# Patient Record
Sex: FEMALE | Race: OTHER | HISPANIC OR LATINO
[De-identification: names, ages, dates, MRNs, and addresses within clinical notes are randomized per-mention and may not be internally consistent; named-entity substitution may affect disease eponyms.]

---

## 2017-05-02 ENCOUNTER — TRANSCRIPTION ENCOUNTER (OUTPATIENT)
Age: 57
End: 2017-05-02

## 2017-05-02 ENCOUNTER — INPATIENT (INPATIENT)
Facility: HOSPITAL | Age: 57
LOS: 1 days | Discharge: HOME | DRG: 149 | End: 2017-05-04
Attending: FAMILY MEDICINE | Admitting: FAMILY MEDICINE
Payer: MEDICAID

## 2017-05-02 VITALS
WEIGHT: 139.99 LBS | TEMPERATURE: 98 F | OXYGEN SATURATION: 99 % | RESPIRATION RATE: 16 BRPM | HEART RATE: 79 BPM | HEIGHT: 58 IN | DIASTOLIC BLOOD PRESSURE: 95 MMHG | SYSTOLIC BLOOD PRESSURE: 147 MMHG

## 2017-05-02 DIAGNOSIS — R42 DIZZINESS AND GIDDINESS: ICD-10-CM

## 2017-05-02 DIAGNOSIS — I63.9 CEREBRAL INFARCTION, UNSPECIFIED: ICD-10-CM

## 2017-05-02 DIAGNOSIS — E78.5 HYPERLIPIDEMIA, UNSPECIFIED: ICD-10-CM

## 2017-05-02 DIAGNOSIS — E03.9 HYPOTHYROIDISM, UNSPECIFIED: ICD-10-CM

## 2017-05-02 DIAGNOSIS — Z29.9 ENCOUNTER FOR PROPHYLACTIC MEASURES, UNSPECIFIED: ICD-10-CM

## 2017-05-02 DIAGNOSIS — K21.9 GASTRO-ESOPHAGEAL REFLUX DISEASE WITHOUT ESOPHAGITIS: ICD-10-CM

## 2017-05-02 DIAGNOSIS — R11.2 NAUSEA WITH VOMITING, UNSPECIFIED: ICD-10-CM

## 2017-05-02 DIAGNOSIS — R51 HEADACHE: ICD-10-CM

## 2017-05-02 DIAGNOSIS — Z92.3 PERSONAL HISTORY OF IRRADIATION: Chronic | ICD-10-CM

## 2017-05-02 LAB
ALBUMIN SERPL ELPH-MCNC: 4.6 G/DL — SIGNIFICANT CHANGE UP (ref 3.3–5.2)
ALP SERPL-CCNC: 105 U/L — SIGNIFICANT CHANGE UP (ref 40–120)
ALT FLD-CCNC: 16 U/L — SIGNIFICANT CHANGE UP
ANION GAP SERPL CALC-SCNC: 13 MMOL/L — SIGNIFICANT CHANGE UP (ref 5–17)
APTT BLD: 34.4 SEC — SIGNIFICANT CHANGE UP (ref 27.5–37.4)
AST SERPL-CCNC: 24 U/L — SIGNIFICANT CHANGE UP
BASOPHILS # BLD AUTO: 0 K/UL — SIGNIFICANT CHANGE UP (ref 0–0.2)
BASOPHILS NFR BLD AUTO: 0.4 % — SIGNIFICANT CHANGE UP (ref 0–2)
BILIRUB SERPL-MCNC: 0.3 MG/DL — LOW (ref 0.4–2)
BUN SERPL-MCNC: 11 MG/DL — SIGNIFICANT CHANGE UP (ref 8–20)
CALCIUM SERPL-MCNC: 9.1 MG/DL — SIGNIFICANT CHANGE UP (ref 8.6–10.2)
CHLORIDE SERPL-SCNC: 102 MMOL/L — SIGNIFICANT CHANGE UP (ref 98–107)
CO2 SERPL-SCNC: 26 MMOL/L — SIGNIFICANT CHANGE UP (ref 22–29)
CREAT SERPL-MCNC: 0.47 MG/DL — LOW (ref 0.5–1.3)
EOSINOPHIL # BLD AUTO: 0 K/UL — SIGNIFICANT CHANGE UP (ref 0–0.5)
EOSINOPHIL NFR BLD AUTO: 0.3 % — SIGNIFICANT CHANGE UP (ref 0–6)
GLUCOSE SERPL-MCNC: 113 MG/DL — SIGNIFICANT CHANGE UP (ref 70–115)
HCT VFR BLD CALC: 45.4 % — SIGNIFICANT CHANGE UP (ref 37–47)
HGB BLD-MCNC: 14.9 G/DL — SIGNIFICANT CHANGE UP (ref 12–16)
INR BLD: 0.94 RATIO — SIGNIFICANT CHANGE UP (ref 0.88–1.16)
LIDOCAIN IGE QN: 24 U/L — SIGNIFICANT CHANGE UP (ref 22–51)
LYMPHOCYTES # BLD AUTO: 1.1 K/UL — SIGNIFICANT CHANGE UP (ref 1–4.8)
LYMPHOCYTES # BLD AUTO: 14.2 % — LOW (ref 20–55)
MCHC RBC-ENTMCNC: 31.6 PG — HIGH (ref 27–31)
MCHC RBC-ENTMCNC: 32.8 G/DL — SIGNIFICANT CHANGE UP (ref 32–36)
MCV RBC AUTO: 96.4 FL — SIGNIFICANT CHANGE UP (ref 81–99)
MONOCYTES # BLD AUTO: 0.2 K/UL — SIGNIFICANT CHANGE UP (ref 0–0.8)
MONOCYTES NFR BLD AUTO: 3 % — SIGNIFICANT CHANGE UP (ref 3–10)
NEUTROPHILS # BLD AUTO: 6.2 K/UL — SIGNIFICANT CHANGE UP (ref 1.8–8)
NEUTROPHILS NFR BLD AUTO: 81.2 % — HIGH (ref 37–73)
PLATELET # BLD AUTO: 232 K/UL — SIGNIFICANT CHANGE UP (ref 150–400)
POTASSIUM SERPL-MCNC: 4.2 MMOL/L — SIGNIFICANT CHANGE UP (ref 3.5–5.3)
POTASSIUM SERPL-SCNC: 4.2 MMOL/L — SIGNIFICANT CHANGE UP (ref 3.5–5.3)
PROT SERPL-MCNC: 7.7 G/DL — SIGNIFICANT CHANGE UP (ref 6.6–8.7)
PROTHROM AB SERPL-ACNC: 10.3 SEC — SIGNIFICANT CHANGE UP (ref 9.8–12.7)
RBC # BLD: 4.71 M/UL — SIGNIFICANT CHANGE UP (ref 4.4–5.2)
RBC # FLD: 12.2 % — SIGNIFICANT CHANGE UP (ref 11–15.6)
SODIUM SERPL-SCNC: 141 MMOL/L — SIGNIFICANT CHANGE UP (ref 135–145)
T3 SERPL-MCNC: 135 NG/DL — SIGNIFICANT CHANGE UP (ref 80–200)
T4 AB SER-ACNC: 8.6 UG/DL — SIGNIFICANT CHANGE UP (ref 4.5–12)
T4/T3 UPTAKE INDEX SERPL: 0.94 INDEX — SIGNIFICANT CHANGE UP (ref 0.8–1.3)
TROPONIN T SERPL-MCNC: <0.01 NG/ML — SIGNIFICANT CHANGE UP (ref 0–0.06)
TSH SERPL-MCNC: 0.17 UIU/ML — LOW (ref 0.27–4.2)
WBC # BLD: 7.6 K/UL — SIGNIFICANT CHANGE UP (ref 4.8–10.8)
WBC # FLD AUTO: 7.6 K/UL — SIGNIFICANT CHANGE UP (ref 4.8–10.8)

## 2017-05-02 PROCEDURE — 70553 MRI BRAIN STEM W/O & W/DYE: CPT | Mod: 26

## 2017-05-02 PROCEDURE — 70450 CT HEAD/BRAIN W/O DYE: CPT | Mod: 26

## 2017-05-02 PROCEDURE — 72125 CT NECK SPINE W/O DYE: CPT | Mod: 26

## 2017-05-02 PROCEDURE — 93306 TTE W/DOPPLER COMPLETE: CPT | Mod: 26

## 2017-05-02 PROCEDURE — 99285 EMERGENCY DEPT VISIT HI MDM: CPT | Mod: 25

## 2017-05-02 PROCEDURE — 93010 ELECTROCARDIOGRAM REPORT: CPT

## 2017-05-02 PROCEDURE — 62270 DX LMBR SPI PNXR: CPT

## 2017-05-02 PROCEDURE — 70549 MR ANGIOGRAPH NECK W/O&W/DYE: CPT | Mod: 26

## 2017-05-02 PROCEDURE — 70545 MR ANGIOGRAPHY HEAD W/DYE: CPT | Mod: 26,59

## 2017-05-02 RX ORDER — SODIUM CHLORIDE 9 MG/ML
1000 INJECTION INTRAMUSCULAR; INTRAVENOUS; SUBCUTANEOUS ONCE
Qty: 0 | Refills: 0 | Status: COMPLETED | OUTPATIENT
Start: 2017-05-02 | End: 2017-05-02

## 2017-05-02 RX ORDER — METOCLOPRAMIDE HCL 10 MG
10 TABLET ORAL ONCE
Qty: 0 | Refills: 0 | Status: COMPLETED | OUTPATIENT
Start: 2017-05-02 | End: 2017-05-02

## 2017-05-02 RX ORDER — LEVOTHYROXINE SODIUM 125 MCG
75 TABLET ORAL DAILY
Qty: 0 | Refills: 0 | Status: DISCONTINUED | OUTPATIENT
Start: 2017-05-02 | End: 2017-05-04

## 2017-05-02 RX ORDER — PANTOPRAZOLE SODIUM 20 MG/1
40 TABLET, DELAYED RELEASE ORAL DAILY
Qty: 0 | Refills: 0 | Status: DISCONTINUED | OUTPATIENT
Start: 2017-05-02 | End: 2017-05-04

## 2017-05-02 RX ORDER — ONDANSETRON 8 MG/1
4 TABLET, FILM COATED ORAL EVERY 6 HOURS
Qty: 0 | Refills: 0 | Status: DISCONTINUED | OUTPATIENT
Start: 2017-05-02 | End: 2017-05-02

## 2017-05-02 RX ORDER — ACETAMINOPHEN 500 MG
650 TABLET ORAL EVERY 6 HOURS
Qty: 0 | Refills: 0 | Status: DISCONTINUED | OUTPATIENT
Start: 2017-05-02 | End: 2017-05-04

## 2017-05-02 RX ORDER — ENOXAPARIN SODIUM 100 MG/ML
40 INJECTION SUBCUTANEOUS DAILY
Qty: 0 | Refills: 0 | Status: DISCONTINUED | OUTPATIENT
Start: 2017-05-02 | End: 2017-05-02

## 2017-05-02 RX ORDER — ATORVASTATIN CALCIUM 80 MG/1
40 TABLET, FILM COATED ORAL AT BEDTIME
Qty: 0 | Refills: 0 | Status: DISCONTINUED | OUTPATIENT
Start: 2017-05-02 | End: 2017-05-04

## 2017-05-02 RX ORDER — ONDANSETRON 8 MG/1
4 TABLET, FILM COATED ORAL ONCE
Qty: 0 | Refills: 0 | Status: COMPLETED | OUTPATIENT
Start: 2017-05-02 | End: 2017-05-02

## 2017-05-02 RX ORDER — MECLIZINE HCL 12.5 MG
25 TABLET ORAL EVERY 6 HOURS
Qty: 0 | Refills: 0 | Status: DISCONTINUED | OUTPATIENT
Start: 2017-05-02 | End: 2017-05-02

## 2017-05-02 RX ORDER — MECLIZINE HCL 12.5 MG
25 TABLET ORAL ONCE
Qty: 0 | Refills: 0 | Status: COMPLETED | OUTPATIENT
Start: 2017-05-02 | End: 2017-05-02

## 2017-05-02 RX ORDER — ONDANSETRON 8 MG/1
4 TABLET, FILM COATED ORAL EVERY 6 HOURS
Qty: 0 | Refills: 0 | Status: DISCONTINUED | OUTPATIENT
Start: 2017-05-02 | End: 2017-05-04

## 2017-05-02 RX ORDER — SODIUM CHLORIDE 9 MG/ML
1000 INJECTION INTRAMUSCULAR; INTRAVENOUS; SUBCUTANEOUS
Qty: 0 | Refills: 0 | Status: DISCONTINUED | OUTPATIENT
Start: 2017-05-02 | End: 2017-05-04

## 2017-05-02 RX ADMIN — Medication 10 MILLIGRAM(S): at 17:59

## 2017-05-02 RX ADMIN — ONDANSETRON 4 MILLIGRAM(S): 8 TABLET, FILM COATED ORAL at 17:59

## 2017-05-02 RX ADMIN — SODIUM CHLORIDE 2000 MILLILITER(S): 9 INJECTION INTRAMUSCULAR; INTRAVENOUS; SUBCUTANEOUS at 17:59

## 2017-05-02 RX ADMIN — Medication 25 MILLIGRAM(S): at 15:04

## 2017-05-02 NOTE — ED PROVIDER NOTE - OBJECTIVE STATEMENT
56 year old female with a PMH of levothyroxine and hyperlipidemia presents today for chief complaint of left chest pain , dizziness, and vomiting since 6 am this morning. Patient describes the chest pain as 8/10 left chest pain without radiation. Patient has also vomited green vomitus 10 times. Patient denies any cardiac disease and abdominal pain. Patient denies having any similar symptoms in the past. Denies headache, dizziness.

## 2017-05-02 NOTE — ED PROVIDER NOTE - PROGRESS NOTE DETAILS
patient feeling better but still symptomatic with HA and neck pain worse with changes in position but also has at rest will admit for additional supportive care/ mri vasc anomaly/ dissection

## 2017-05-02 NOTE — H&P ADULT - NSHPPHYSICALEXAM_GEN_ALL_CORE
General: mild distress due to nausea, lying in bed, well developed, well groomed   HEENT: EOMI/CUATE, throat no exudates, no erythema, tympanic membrane on the left with normal light reflex, tympanic membrane on the right with absent light reflex, erythema of the external acustic meatus, however no bulging of membrane, no air fluid levels .  No nystagmus. No hearing loss.   Neck: No JVD, supple, normal thyroid gland, carotid bruit on the right side   Cardiac: s1/s2/rrr/no s4 or s3, no murmurs  Pulmonary:   clear to auscultation bilaterally, no rales, no ronchi  Abdominal: soft, no guarding or rebound, bowel sounds positive  Extremities: no calf tednerness, no edema, no cyanosis   Neurological: alert and oriented x 3, cranial nerves 2-12 intact, Romberg (-), normal gait, no disdaochokinesis, finger to nose is normal.  MMSE = 28/30  NIHS score=0.  Maxwell-Hallpike manuveur negative. General: mild distress due to nausea, lying in bed, well developed, well groomed   HEENT: EOMI/CUATE, throat no exudates, no erythema, tympanic membrane on the left with normal light reflex, tympanic membrane on the right with absent light reflex, erythema of the external acustic meatus, however no bulging of membrane, no air fluid levels .  No nystagmus. No hearing loss. Brudiznski and Kerning is negative.   Neck: No JVD, supple, normal thyroid gland, carotid bruit on the right side   Cardiac: s1/s2/rrr/no s4 or s3, no murmurs  Pulmonary:   clear to auscultation bilaterally, no rales, no ronchi  Abdominal: soft, no guarding or rebound, bowel sounds positive  Extremities: no calf tednerness, no edema, no cyanosis   Neurological: alert and oriented x 3, cranial nerves 2-12 intact, Romberg (-), normal gait, no disdaochokinesis, finger to nose is normal.  MMSE = 28/30  NIHS score=0.  Valentina-Hallpike manuveur negative. General: mild distress due to nausea, lying in bed, well developed, well groomed   HEENT: EOMI/CUATE, throat no exudates, no erythema, tympanic membrane on the left with normal light reflex, tympanic membrane on the right with absent light reflex, erythema of the external acustic meatus, however no bulging of membrane, no air fluid levels .  No nystagmus. No hearing loss. Brudiznski and Kerning is negative.   Neck: No JVD, supple, normal thyroid gland, carotid bruit on the right side   Cardiac: s1/s2/rrr/no s4 or s3, no murmurs  Pulmonary:   clear to auscultation bilaterally, no rales, no ronchi  Abdominal: soft, no guarding or rebound, bowel sounds positive  Extremities: no calf tednerness, no edema, no cyanosis   Neurological: alert and oriented x 3, cranial nerves 2-12 intact, Romberg (-), normal gait, no disdaochokinesis, finger to nose is normal.  MMSE = 28/30  NIHS score=0. General: mild distress due to nausea, lying in bed, well developed, well groomed   HEENT: EOMI/CUATE, throat no exudates, no erythema, tympanic membrane on the left with normal light reflex, tympanic membrane on the right with absent light reflex, erythema of the external acustic meatus, however no bulging of membrane, no air fluid levels .  No nystagmus. No hearing loss. Brudiznski and Kerning is negative.   Neck: No JVD, supple, normal thyroid gland, carotid bruit on the right side   Cardiac: s1/s2/rrr/no s4 or s3, no murmurs  Pulmonary:   clear to auscultation bilaterally, no rales, no ronchi  Abdominal: soft, no guarding or rebound, bowel sounds positive  Extremities: no calf tednerness, no edema, no cyanosis   Neurological: alert and oriented x 3, cranial nerves 2-12 intact, Romberg (-),  patient ambulates with difficulty swaying from side to side, no disdaochokinesis, finger to nose is normal.  MMSE = 28/30  NIHS score=0.

## 2017-05-02 NOTE — H&P ADULT - PROBLEM SELECTOR PLAN 2
- continue atorvastatin  - lipid panel - cause of vertigo is unclear   - meclizine 25 mg q 6 hours   - Romberg is negative,  neurological exam is normal, will do Valentina-Hallpike manuveur once patient's neck ache has resolved  - CT of head, MRI/MRA of head, carotid ultrasound, TTE - cause of vertigo is unclear   - meclizine 25 mg q 6 hours PRN  - Romberg is negative,  neurological exam is normal, will do Valentina-Hallpike manuveur once patient's neck ache has resolved  - CT of head, MRI/MRA of head, carotid ultrasound, TTE - cause of vertigo is unclear   - meclizine 25 mg q 6 hours PRN  - Romberg is negative,  neurological exam is normal, no nystagmus, will do [place]-Hallpike manuveur once patient's neck ache has resolved  - CT of head, MRI/MRA of head, carotid ultrasound, TTE

## 2017-05-02 NOTE — ED ADULT TRIAGE NOTE - CHIEF COMPLAINT QUOTE
pt presents to ED with dizziness and n/v upon waking up this morning. pt has hx of vertigo. denies chest pain. breathing si even and unlabored.

## 2017-05-02 NOTE — ED PROVIDER NOTE - MEDICAL DECISION MAKING DETAILS
56 year female with chest pain, dizziness, and vomiting. No ST elevations or depressions. Troponin negative. Symptoms likely secondary to vertigo. Meclizine 25 mg and 1 bolus of NS given. Patient reports improvement.

## 2017-05-02 NOTE — ED STATDOCS - DETAILS:
ISean, performed the initial face to face focussed bedside interview with this patient regarding history of present illness as well as a targetted review of systems and an independent physical examination. This brief note is representative of this interaction during which we determined the patient should be transferred to the main ED for further evaluation and treatment and a complete note to be completed by the primary provider.

## 2017-05-02 NOTE — H&P ADULT - PROBLEM SELECTOR PLAN 5
- TSH is low, however t4 and t3 are normal   - continue with levothyroxine - tylenol PRN  - if pain is not controlled percocet - continue atorvastatin  - lipid panel

## 2017-05-02 NOTE — ED STATDOCS - PROGRESS NOTE DETAILS
: Bruna. 57 y/o F pt w/ PMHx enlarged thyroid presents to the ED c/o dizziness, headache, and vomiting onset today morning at 06:00. Pt states that she woke up today w/ her sx. Pt also notes neck pain. Pt had similar sx years ago and was dx w/ vertigo. Pt's dizziness is aggravated w/ movement, and is relieved w/ rest. Pt's dizziness lasts for 30 seconds and resolves after vomiting. Pt denies difficulty ambulating, SOB, chest pain, hearing changes, fever, or any other complaints. Pt is allergic to Amoxicillin. Pt w/ horizontal beating nystagmus which was not present initially but was present after I got pt dizzy. No meningismus or photophobia. Brief episodic intermittent provoked vertigo, may be BPPV, but onset w/ headache and neck pain suggest other possible etiologies. Focused evaluation, orders entered, placed in main for further evaluation by another provider.

## 2017-05-02 NOTE — H&P ADULT - NSHPREVIEWOFSYSTEMS_GEN_ALL_CORE
patient denies diarrhea, denies diarrhea, denies abdominal pain  complains of some sensation of acidity in her mouth but that only started after the patient started vomiting  denies fever, chills, denies nasal discharge, cough, chest pain or shortness of breath   rest of review of systems as above

## 2017-05-02 NOTE — H&P ADULT - PROBLEM SELECTOR PLAN 1
- rule out CVA  - admit to 5 tower step down under Dr. Orellana  - ambulate with assistance  - diet dash/tlc  - CBC/CMP to be repeated in the AM  - TTE/carotid ultrasound, CT of the head has already been done  - MRI/MRA of the head   - - rule out CVA  - admit to 5 tower step down under Dr. Orellana  - ambulate with assistance  - diet dash/tlc  - CBC/CMP to be repeated in the AM, Mg, Phosphorus, hemoglobin a1c, PT/INR/lipid panel in AM, TSH  - TTE/carotid ultrasound, CT of the head has already been done  - MRI/MRA of the head   - aspirin 325 mg, atorvastin 40 mg   = NIHSS score, fall precautions  - neurology consult with Dr. Darnell - patient has sudden onset dizziness, nausea and vomiting could be due to CVA  - rule out CVA  - admit to 5 tower step down under Dr. Orellana  - ambulate with assistance  - diet dash/tlc  - CBC/CMP to be repeated in the AM, Mg, Phosphorus, hemoglobin a1c, PT/INR/lipid panel in AM, TSH  - TTE/carotid ultrasound, CT of the head has already been done  - MRI/MRA of the head   - aspirin 325 mg, atorvastin 40 mg   = NIHSS score, fall precautions  - neurology consult with Dr. Darnell - patient has sudden onset dizziness, nausea and vomiting could be due to CVA  - rule out CVA  - admit to 5 tower step down under Dr. Orellana  - ambulate with assistance  - diet dash/tlc  - CBC/CMP to be repeated in the AM, Mg, Phosphorus, hemoglobin a1c, PT/INR/lipid panel in AM, TSH  - TTE/carotid ultrasound, CT of the head has already been done  - MRI/MRA of the head   - atorvastin 40 mg   - NIHSS score, fall precautions  - neurology consult with Dr. Darnell - patient has sudden onset dizziness, nausea and vomiting could be due to CVA  - rule out CVA  - admit to 5 tower step down under Dr. Orellana  - ambulate with assistance  - diet dash/tlc  - CBC/CMP to be repeated in the AM, Mg, Phosphorus, hemoglobin a1c, PT/INR/lipid panel in AM, TSH  - TTE/carotid ultrasound, CT of the head has already been done  - MRI/MRA of the head   - atorvastin 40 mg, aspirin is held until SAH is ruled out  - NIHSS score, fall precautions  - neurology consult with Dr. Darnell - patient has sudden onset dizziness, nausea and vomiting could be due to CVA (cerebellar)  - rule out CVA  - admit to 5 tower step down under Dr. Orellana  - ambulate with assistance  - diet dash/tlc  - CBC/CMP to be repeated in the AM, Mg, Phosphorus, hemoglobin a1c, PT/INR/lipid panel in AM, TSH  - TTE/carotid ultrasound, CT of the head has already been done  - MRI/MRA of the head   - atorvastin 40 mg, aspirin is held until SAH is ruled out  - NIHSS score, fall precautions  - neurology consult with Dr. Darnell

## 2017-05-02 NOTE — H&P ADULT - NSHPLABSRESULTS_GEN_ALL_CORE
CBC:                    14.9   7.6   )-----------( 232      ( 02 May 2017 15:26 )             45.4      CMP:   05-02    141  |  102  |  11.0  ----------------------------<  113  4.2   |  26.0  |  0.47<L>    Ca    9.1      02 May 2017 15:26    TPro  7.7  /  Alb  4.6  /  TBili  0.3<L>  /  DBili  x   /  AST  24  /  ALT  16  /  AlkPhos  105  05-02    CT scan:   There is no compelling evidence for an acute infarction. There is no   evidence of mass, mass effect, midline shift or extra-axial fluid   collection. The lateral ventricles and cortical sulci are normal in size   and configuration. Mild to moderate inflammatory mucosal changes are seen   throughout the various portions of the paranasal sinuses. Old left orbit   medial wall fracture deformity. The orbits and mastoid air cells are   otherwise unremarkable. The calvarium is intact. Developmental nonfused   posterior C1 arch is noted.        Thyroid function studies:     TSH: 0.17  T4= 8.6   T3= 135     ECG: regular rate, regular rhythm, normal axis, no st elevtion, no st depression, no q waves, biphasic t waves in v2/v3

## 2017-05-02 NOTE — H&P ADULT - NSHPSOCIALHISTORY_GEN_ALL_CORE
doesn't smoke, doesn't drink, doesn't use any kind of illegal drugs  patient lives with son    - doesn't work, previously worked in factory

## 2017-05-02 NOTE — H&P ADULT - PROBLEM SELECTOR PLAN 3
- cause of vertigo is unclear   - meclizine 25 mg q 6 hours   - Romberg is negative,  neurological exam is normal  - CT of head, MRI/MRA of head, carotid ultrasound, TTE - cause of vertigo is unclear   - meclizine 25 mg q 6 hours   - Romberg is negative,  neurological exam is normal, will do Valentina-Hallpike manuveur once patient's neck ache has resolved  - CT of head, MRI/MRA of head, carotid ultrasound, TTE - ondansentron 4 mg q 6 hours   - meclizine 25 mg q 6 hours in case of vertigo

## 2017-05-02 NOTE — H&P ADULT - PROBLEM SELECTOR PLAN 6
- secondary to vomiting  - pantoprazole  40 mg IV - TSH is low, however t4 and t3 are normal   - continue with levothyroxine

## 2017-05-02 NOTE — ED ADULT NURSE NOTE - OBJECTIVE STATEMENT
pt c/o dizziness and headache today pt also states that she is very nauseous and has been vomiting today

## 2017-05-02 NOTE — H&P ADULT - HISTORY OF PRESENT ILLNESS
Patient is 56 year old female with previous medical history of hyperlipidemia, hyperthyroidism who came into the hospital complaining of vertigo which started this morning at 6 am as she got out of bed and walked to the bathroom. The patient states that the vertigo started as she was walking, and it was accompanied by several episodes of non-bloody non-billious vomiting. The patient states that today she had 10 episodes of vomiting.  The patient says the "vertigo" feels like the room is spinning around her and it is excacebrated by standing and walking around. She denies excacerbation upon lying down and moving her head around.   The patient had vertigo in the past and was previously at [facility] for vertigo 5 years ago and was discharged with diagnosis of benign vertigo. The vertigo is also accompanied by headache and neck ache. The patient never had similar headache before, it is 8/10, constant, pulsating,  occipital, radiating down to the neck and the patient states that she has some difficulty moving her neck. The patient has had some mild headache in the past, but nothing similar to the headache she is having now. Patient has no history of migraines and has no family history of migraines. Denies sonophobia, however complains of photofobia which also started this morning.  Denies unilateral nasal discharge, denies eye tearing.    Denies history of recent viral infections in the past month, denies tinnitus, denies hearing loss.   Denies fever, chills, cough,  recent travel, sick contacts, denies mosquito bites, denies tick bites.   Denies any neurological symptoms such as double vision, blurred vision, loss of motor strenght, loss of sensation. Patient is 56 year old female with previous medical history of hyperlipidemia, hyperthyroidism who came into the hospital complaining of vertigo which started this morning at 6 am as she got out of bed and walked to the bathroom. The patient states that the vertigo started as she was walking, and it was accompanied by several episodes of non-bloody non-billious vomiting. The patient states that today she had 10 episodes of vomiting.  The patient says the "vertigo" feels like the room is spinning around her and it is excacebrated by standing and walking around. She denies excacerbation upon lying down and moving her head around.   The patient had vertigo in the past and was previously at [facility] for vertigo 5 years ago and was discharged with diagnosis of benign vertigo. The vertigo is also accompanied by headache and neck ache. The patient never had similar headache before, it is 8/10, constant, pulsating,  occipital, radiating down to the neck and the patient states that she has some difficulty moving her neck. The patient has had some mild headache in the past, but nothing similar to the headache she is having now. Patient has no history of migraines and has no family history of migraines. Denies seeing lights before headache, denies seeing wavy lines, denies sonophobia, however complains of photofobia which also started this morning.  Denies unilateral nasal discharge, denies eye tearing.    Denies history of recent viral infections in the past month, denies tinnitus, denies hearing loss.   Denies fever, chills, cough,  recent travel, sick contacts, denies mosquito bites, denies tick bites.   Denies any neurological symptoms such as double vision, blurred vision, loss of motor strength loss of sensation. Patient is 56 year old female with previous medical history of hyperlipidemia, hyperthyroidism who came into the hospital complaining of vertigo which started this morning at 6 am as she got out of bed and walked to the bathroom. The patient states that the vertigo started as she was walking, and it was accompanied by several episodes of non-bloody non-billious vomiting. The patient states that today she had 10 episodes of vomiting.  The patient says the "vertigo" feels like the room is spinning around her and it is excacebrated by standing and walking around. She denies excacerbation upon lying down and moving her head around.   The patient had vertigo in the past and was previously at [facility] for vertigo 5 years ago and was discharged with diagnosis of benign vertigo. The vertigo is also accompanied by headache and neck ache. The patient never had similar headache before, it is 8/10, constant, pulsating,  occipital, radiating down to the neck and the patient states that she has some difficulty moving her neck. The patient has had some mild headache in the past, but nothing similar to the headache she is having now. Patient has no history of migraines and has no family history of migraines. Denies seeing lights before headache, denies seeing wavy lines, denies sonophobia, however complains of photofobia which also started this morning.  Denies unilateral nasal discharge, denies eye tearing.    Denies history of recent viral infections in the past month, denies tinnitus, denies hearing loss.   Denies fever, chills, cough, rashes,  recent travel, sick contacts, denies mosquito bites, denies tick bites.   Denies any neurological symptoms such as double vision, blurred vision, loss of motor strength loss of sensation. Patient is 56 year old female with previous medical history of hyperlipidemia, hyperthyroidism who came into the hospital complaining of vertigo which started this morning at 6 am as she got out of bed and walked to the bathroom. The patient states that the vertigo started as she was walking, she started to have some epigastric burning pain which was accompanied by several episodes of non-bloody non-billious vomiting. The patient states that today she had 10 episodes of vomiting.  The patient says the "vertigo" feels like the room is spinning around her and it is exacerbated by standing and walking around. She denies exacerbation upon lying down and moving her head around.   The patient had vertigo in the past and was previously at [facility] for vertigo 5 years ago and was discharged with diagnosis of benign vertigo. The vertigo is also accompanied by headache and neck ache. The patient never had similar headache before, it is 8/10, constant, pulsating,  occipital, radiating down to the neck and the patient states that she has some difficulty moving her neck. The patient has had some mild headache in the past, but nothing similar to the headache she is having now. Patient has no history of migraines and has no family history of migraines. Denies seeing lights before headache, denies seeing wavy lines, denies sonophobia, however complains of photophobia which also started this morning.  Denies unilateral nasal discharge, denies eye tearing.    Denies history of recent viral infections in the past month, denies tinnitus, denies hearing loss.   Denies fever, chills, cough, rashes,  recent travel, sick contacts, denies mosquito bites, denies tick bites.   Denies any neurological symptoms such as double vision, blurred vision, loss of motor strength loss of sensation. Patient is 56 year old female with previous medical history of hyperlipidemia, hyperthyroidism who came into the hospital complaining of vertigo which started this morning at 6 am as she got out of bed and walked to the bathroom. The patient states that the vertigo started as she was walking, she started to have some left sided non-radting pressure like 7/10 chest pain for a couple of seconds, which was accompanied by 10 episodes of non-bloody nonbilious vomiting. The chest pain has not reoccurred since then and she has never had it before.   The patient says the "vertigo" feels like the room is spinning around her and it is exacerbated by standing and walking around. She denies exacerbation upon lying down and moving her head around.   The patient had vertigo in the past and was previously at [facility] for vertigo 5 years ago and was discharged with diagnosis of non-specific vertigo. The vertigo is also accompanied by headache and neck ache. The neck and headache started a minute after the patient had the vertigo. The patient never had similar headache before, it is 8/10, constant, pulsating,  occipital, radiating down to the neck and the patient states that she has some difficulty moving her neck. The patient has had some mild headache in the past, but nothing similar to the headache she is having now. Patient has no history of migraines and has no family history of migraines. Denies seeing lights before headache, denies seeing wavy lines, denies sonophobia, however complains of photophobia which also started this morning.  Denies unilateral nasal discharge, denies eye tearing.    Denies history of recent viral infections in the past month, denies tinnitus, denies hearing loss.   Denies fever, chills, cough, rashes,  recent travel, sick contacts, denies mosquito bites, denies tick bites.   Denies any neurological symptoms such as double vision, blurred vision, loss of motor strength loss of sensation. Patient is 56 year old female with previous medical history of hyperlipidemia, hyperthyroidism who came into the hospital complaining of vertigo which started this morning at 6 am as she got out of bed and walked to the bathroom. The patient states that the vertigo started as she was walking, she started to have some left sided non-radting pressure like 7/10 chest pain for a couple of seconds, which was accompanied by seveeral episodes of non-bloody nonbilious vomiting (she had up to 10 episodes of vomiting during the day). The chest pain has not reoccurred since then and she has never had it before.   The patient says the "vertigo" feels like the room is spinning around her and it is exacerbated by standing and walking around. She denies exacerbation upon lying down and moving her head around.   The patient had vertigo in the past and was previously at [facility] for vertigo 5 years ago and was discharged with diagnosis of non-specific vertigo. The vertigo is also accompanied by headache and neck ache. The neck and headache started a minute after the patient had the vertigo. The patient never had similar headache before, it is 8/10, constant, pulsating,  occipital, radiating down to the neck and the patient states that she has some difficulty moving her neck. The patient has had some mild headache in the past, but nothing similar to the headache she is having now. Patient has no history of migraines and has no family history of migraines. Denies seeing lights before headache, denies seeing wavy lines, denies sonophobia, however complains of photophobia which also started this morning.  Denies unilateral nasal discharge, denies eye tearing.    Denies history of recent viral infections in the past month, denies tinnitus, denies hearing loss.   Denies fever, chills, cough, rashes,  recent travel, sick contacts, denies mosquito bites, denies tick bites.   Denies any neurological symptoms such as double vision, blurred vision, loss of motor strength loss of sensation. Patient is 56 year old female with previous medical history of hyperlipidemia, hyperthyroidism who came into the hospital complaining of vertigo which started this morning at 6 am as she got out of bed and walked to the bathroom. The patient states that the vertigo started as she was walking, she started to have some left sided non-radting pressure like 7/10 chest pain for a couple of seconds, which was accompanied by several episodes of non-bloody nonbilious vomiting (she had up to 10 episodes of vomiting during the day). The chest pain has not reoccurred since then and she has never had it before.   The patient says the "vertigo" feels like the room is spinning around her and it is exacerbated by standing and walking around. She denies exacerbation upon lying down and moving her head around.   The patient had vertigo in the past and was previously at [facility] for vertigo 5 years ago and was discharged with diagnosis of non-specific vertigo and told to follow up with PCP. The vertigo is also accompanied by headache and neck ache. The neck and headache started a minute after the patient had the vertigo. The patient never had similar headache before, it is 8/10, constant, pulsating,  occipital, radiating down to the neck and the patient states that she has some difficulty moving her neck. The patient has had some mild headache in the past, but nothing similar to the headache she is having now. Patient has no history of migraines and has no family history of migraines. Denies seeing lights before headache, denies seeing wavy lines, denies sonophobia, however complains of photophobia which also started this morning.  Denies unilateral nasal discharge, denies eye tearing.    Denies history of recent viral infections in the past month, denies tinnitus, denies hearing loss.   Denies fever, chills, cough, rashes,  recent travel, sick contacts, denies mosquito bites, denies tick bites.   Denies any neurological symptoms such as double vision, blurred vision, loss of motor strength loss of sensation.

## 2017-05-02 NOTE — H&P ADULT - ASSESSMENT
Patient is 56 year old female with previous medical history of hyperlipidemia, hyperthyroidism who came into the hospital complaining of vertigo which started this morning at 6 am as she got out of bed and walked to the bathroom.  Patient says vertigo is excacerbated by walking to the bathroom. Patient has had similar episodes of vertigo before, however all workup was negative.

## 2017-05-02 NOTE — H&P ADULT - PROBLEM SELECTOR PLAN 4
- ondansentron 4 mg q 6 hours   - meclizine 25 mg q 6 hours - ondansentron 4 mg q 6 hours   - meclizine 25 mg q 6 hours in case of vertigo - tylenol PRN  - if pain is not controlled percocet - cause of headache is unclear at this point, CT of head negative for subarachnoid hemorrhage  - will try to obtain consent for spinal tap to rule out meningitis   - tylenol PRN  - if pain is not controlled: percocet - cause of headache is unclear at this point, CT of head negative for subarachnoid hemorrhage  - will try to obtain consent for spinal tap to rule out aseptic meningitis/SAH  - tylenol PRN  - if pain is not controlled: percocet

## 2017-05-03 LAB
ALBUMIN SERPL ELPH-MCNC: 3.7 G/DL — SIGNIFICANT CHANGE UP (ref 3.3–5.2)
ALP SERPL-CCNC: 81 U/L — SIGNIFICANT CHANGE UP (ref 40–120)
ALT FLD-CCNC: 13 U/L — SIGNIFICANT CHANGE UP
ANION GAP SERPL CALC-SCNC: 10 MMOL/L — SIGNIFICANT CHANGE UP (ref 5–17)
APPEARANCE CSF: CLEAR — SIGNIFICANT CHANGE UP
AST SERPL-CCNC: 18 U/L — SIGNIFICANT CHANGE UP
BASOPHILS # BLD AUTO: 0 K/UL — SIGNIFICANT CHANGE UP (ref 0–0.2)
BASOPHILS NFR BLD AUTO: 0.5 % — SIGNIFICANT CHANGE UP (ref 0–2)
BILIRUB SERPL-MCNC: 0.4 MG/DL — SIGNIFICANT CHANGE UP (ref 0.4–2)
BUN SERPL-MCNC: 10 MG/DL — SIGNIFICANT CHANGE UP (ref 8–20)
CALCIUM SERPL-MCNC: 8.5 MG/DL — LOW (ref 8.6–10.2)
CHLORIDE SERPL-SCNC: 110 MMOL/L — HIGH (ref 98–107)
CHOLEST SERPL-MCNC: 191 MG/DL — SIGNIFICANT CHANGE UP (ref 110–199)
CO2 SERPL-SCNC: 25 MMOL/L — SIGNIFICANT CHANGE UP (ref 22–29)
COLOR CSF: SIGNIFICANT CHANGE UP
CREAT SERPL-MCNC: 0.39 MG/DL — LOW (ref 0.5–1.3)
CSF PCR RESULT: SIGNIFICANT CHANGE UP
EOSINOPHIL # BLD AUTO: 0.2 K/UL — SIGNIFICANT CHANGE UP (ref 0–0.5)
EOSINOPHIL NFR BLD AUTO: 1.9 % — SIGNIFICANT CHANGE UP (ref 0–6)
GLUCOSE CSF-MCNC: 63 MG/DL — SIGNIFICANT CHANGE UP (ref 40–70)
GLUCOSE SERPL-MCNC: 98 MG/DL — SIGNIFICANT CHANGE UP (ref 70–115)
GRAM STN FLD: SIGNIFICANT CHANGE UP
HBA1C BLD-MCNC: 5.4 % — SIGNIFICANT CHANGE UP (ref 4–5.6)
HCT VFR BLD CALC: 40.4 % — SIGNIFICANT CHANGE UP (ref 37–47)
HDLC SERPL-MCNC: 63 MG/DL — LOW
HGB BLD-MCNC: 13.2 G/DL — SIGNIFICANT CHANGE UP (ref 12–16)
LIPID PNL WITH DIRECT LDL SERPL: 108 MG/DL — SIGNIFICANT CHANGE UP
LYMPHOCYTES # BLD AUTO: 2.5 K/UL — SIGNIFICANT CHANGE UP (ref 1–4.8)
LYMPHOCYTES # BLD AUTO: 29.4 % — SIGNIFICANT CHANGE UP (ref 20–55)
MAGNESIUM SERPL-MCNC: 2.3 MG/DL — SIGNIFICANT CHANGE UP (ref 1.8–2.5)
MCHC RBC-ENTMCNC: 31.7 PG — HIGH (ref 27–31)
MCHC RBC-ENTMCNC: 32.7 G/DL — SIGNIFICANT CHANGE UP (ref 32–36)
MCV RBC AUTO: 96.9 FL — SIGNIFICANT CHANGE UP (ref 81–99)
MONOCYTES # BLD AUTO: 0.6 K/UL — SIGNIFICANT CHANGE UP (ref 0–0.8)
MONOCYTES NFR BLD AUTO: 7.4 % — SIGNIFICANT CHANGE UP (ref 3–10)
NEUTROPHILS # BLD AUTO: 5.2 K/UL — SIGNIFICANT CHANGE UP (ref 1.8–8)
NEUTROPHILS # CSF: SIGNIFICANT CHANGE UP %
NEUTROPHILS NFR BLD AUTO: 60.4 % — SIGNIFICANT CHANGE UP (ref 37–73)
NRBC NFR CSF: 1 /UL — SIGNIFICANT CHANGE UP (ref 0–5)
PHOSPHATE SERPL-MCNC: 4 MG/DL — SIGNIFICANT CHANGE UP (ref 2.4–4.7)
PLATELET # BLD AUTO: 197 K/UL — SIGNIFICANT CHANGE UP (ref 150–400)
POTASSIUM SERPL-MCNC: 3.8 MMOL/L — SIGNIFICANT CHANGE UP (ref 3.5–5.3)
POTASSIUM SERPL-SCNC: 3.8 MMOL/L — SIGNIFICANT CHANGE UP (ref 3.5–5.3)
PROT CSF-MCNC: 24 MG/DL — SIGNIFICANT CHANGE UP (ref 15–45)
PROT SERPL-MCNC: 6.5 G/DL — LOW (ref 6.6–8.7)
RBC # BLD: 4.17 M/UL — LOW (ref 4.4–5.2)
RBC # CSF: 4 /CMM — HIGH (ref 0–1)
RBC # FLD: 12.4 % — SIGNIFICANT CHANGE UP (ref 11–15.6)
SODIUM SERPL-SCNC: 145 MMOL/L — SIGNIFICANT CHANGE UP (ref 135–145)
TOTAL CHOLESTEROL/HDL RATIO MEASUREMENT: 3 RATIO — LOW (ref 3.3–7.1)
TRIGL SERPL-MCNC: 102 MG/DL — SIGNIFICANT CHANGE UP (ref 10–200)
TROPONIN T SERPL-MCNC: <0.01 NG/ML — SIGNIFICANT CHANGE UP (ref 0–0.06)
TUBE TYPE: SIGNIFICANT CHANGE UP
WBC # BLD: 8.5 K/UL — SIGNIFICANT CHANGE UP (ref 4.8–10.8)
WBC # FLD AUTO: 8.5 K/UL — SIGNIFICANT CHANGE UP (ref 4.8–10.8)

## 2017-05-03 PROCEDURE — 93010 ELECTROCARDIOGRAM REPORT: CPT

## 2017-05-03 PROCEDURE — 99233 SBSQ HOSP IP/OBS HIGH 50: CPT | Mod: GC

## 2017-05-03 PROCEDURE — 93880 EXTRACRANIAL BILAT STUDY: CPT | Mod: 26

## 2017-05-03 RX ORDER — ASPIRIN/CALCIUM CARB/MAGNESIUM 324 MG
325 TABLET ORAL DAILY
Qty: 0 | Refills: 0 | Status: DISCONTINUED | OUTPATIENT
Start: 2017-05-03 | End: 2017-05-03

## 2017-05-03 RX ORDER — ATORVASTATIN CALCIUM 80 MG/1
1 TABLET, FILM COATED ORAL
Qty: 0 | Refills: 0 | COMMUNITY
Start: 2017-05-03

## 2017-05-03 RX ORDER — ASPIRIN/CALCIUM CARB/MAGNESIUM 324 MG
325 TABLET ORAL DAILY
Qty: 0 | Refills: 0 | Status: DISCONTINUED | OUTPATIENT
Start: 2017-05-03 | End: 2017-05-04

## 2017-05-03 RX ORDER — ENOXAPARIN SODIUM 100 MG/ML
40 INJECTION SUBCUTANEOUS DAILY
Qty: 0 | Refills: 0 | Status: DISCONTINUED | OUTPATIENT
Start: 2017-05-03 | End: 2017-05-04

## 2017-05-03 RX ADMIN — PANTOPRAZOLE SODIUM 40 MILLIGRAM(S): 20 TABLET, DELAYED RELEASE ORAL at 12:33

## 2017-05-03 RX ADMIN — Medication 75 MICROGRAM(S): at 06:28

## 2017-05-03 RX ADMIN — ATORVASTATIN CALCIUM 40 MILLIGRAM(S): 80 TABLET, FILM COATED ORAL at 01:35

## 2017-05-03 RX ADMIN — SODIUM CHLORIDE 100 MILLILITER(S): 9 INJECTION INTRAMUSCULAR; INTRAVENOUS; SUBCUTANEOUS at 21:14

## 2017-05-03 RX ADMIN — Medication 325 MILLIGRAM(S): at 06:27

## 2017-05-03 RX ADMIN — ENOXAPARIN SODIUM 40 MILLIGRAM(S): 100 INJECTION SUBCUTANEOUS at 21:13

## 2017-05-03 RX ADMIN — ATORVASTATIN CALCIUM 40 MILLIGRAM(S): 80 TABLET, FILM COATED ORAL at 21:13

## 2017-05-03 RX ADMIN — SODIUM CHLORIDE 100 MILLILITER(S): 9 INJECTION INTRAMUSCULAR; INTRAVENOUS; SUBCUTANEOUS at 06:33

## 2017-05-03 RX ADMIN — Medication 325 MILLIGRAM(S): at 12:33

## 2017-05-03 RX ADMIN — SODIUM CHLORIDE 100 MILLILITER(S): 9 INJECTION INTRAMUSCULAR; INTRAVENOUS; SUBCUTANEOUS at 01:35

## 2017-05-03 NOTE — ED PROCEDURE NOTE - CPROC ED POST PROC CARE GUIDE1
Pt admitted for monitoring/Verbal/written post procedure instructions were given to patient/caregiver.

## 2017-05-03 NOTE — CONSULT NOTE ADULT - ASSESSMENT
The patient is a 56y Female with resolved vertigo of likely peripheral cause.  MRI head did not ashow cerebellar pathology and MRA did not reveal VBI.  Small cavernous ICA aneurysm, can follow up with neurosurgeon as outpatient- likely will need to be monitored with serial MRAs.    There is no further neurologic workup suggested at this time.  We will be available for reconsultation as needed.    Thank you.   Han Boyd MD, PhD  [phone]

## 2017-05-03 NOTE — DISCHARGE NOTE ADULT - PLAN OF CARE
NO evidence of CVA. Patient should follow up with neurology and PMD Continue aspirin and statin as prescribed.  Follow up at [facility] May 11th at 2:30pm Improved. Asymptomatic. Encourage adequate fluid hydration. Follow up with PMD Currently without symptoms. Follow up with PMD Follow up with PMD. Over the counter Tylenol if needed for headache Continue statin medication as prescribed. 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 Patient should follow up with Neurosurgery for repeat MRI and further recommendations.

## 2017-05-03 NOTE — ED ADULT NURSE REASSESSMENT NOTE - GENERAL PATIENT STATE
comfortable appearance
comfortable appearance/family/SO at bedside

## 2017-05-03 NOTE — ED ADULT NURSE REASSESSMENT NOTE - NS ED NURSE REASSESS COMMENT FT1
received patient alert skin warm and dry color good iv infusing well on monitor and  spinal tap done will continue to monitor patient
Pt ambulatory to the bathroom, tolerated well.
Assumed pt care at this time. Pt A & OX3, respirations even & unlabored. Pt ambulated to the bathroom with assistance, tolerated well, Pt offers no complaints, states she "feels a little better". Pt waiting for bed assignment, will continue to monitor.
Pt medicated with 12PM meds, tolerated well. Pt remains A & Ox3. c/o slight dizziness with movement but states headache improved.

## 2017-05-03 NOTE — DISCHARGE NOTE ADULT - MEDICATION SUMMARY - MEDICATIONS TO STOP TAKING
I will STOP taking the medications listed below when I get home from the hospital:    pravastatin 20 mg oral tablet  -- 1 tab(s) by mouth once a day (at bedtime)

## 2017-05-03 NOTE — DISCHARGE NOTE ADULT - CARE PROVIDERS DIRECT ADDRESSES
,DirectAddress_Unknown,DirectAddress_Unknown,[email or web address]

## 2017-05-03 NOTE — ED PROCEDURE NOTE - DETAILS:
ATTENDING MD:  I, Sean Eagle, was available in the Emergency Department throughout the entire procedure and I was present during the key portions. I agree with the procedure as documented.

## 2017-05-03 NOTE — DISCHARGE NOTE ADULT - HOSPITAL COURSE
Patient is 56 year old female with previous medical history of hyperlipidemia, hyperthyroidism who came into the hospital complaining of vertigo which started on the morning of admission as she got out of bed and walked to the bathroom.  Patient says vertigo was exacerbated by walking to the bathroom. Patient has had similar episodes of vertigo before, however all workup was negative. Patients CT head, MRI head and neck were negative for any acute infarctions. Her CSF studies were unremarkable. The patient was followed by Neurology and noted to opening pressure mildly elevated, could be due to positioning (if knees still bent in lat decub position) or pain/discomfort.  She did not have signs or symptoms of intracranial hypertension.  She was cleared by neurology for follow up.  The patient, MRA head, noted for small cavernous ICA aneurysm.  Neurology recommendations are for  follow up with neurosurgeon as outpatient for likely serial MRAs.   The patient was hemodynamically stable for discharge, her nausea, vomiting and vertigo had subsided.  She was able to ambulate, and tolerate PO. The patient is medically optimized for discharge to follow up with  on 5/11/2017 at 2:30pm.

## 2017-05-03 NOTE — DISCHARGE NOTE ADULT - PROVIDER TOKENS
FREE:[LAST:[[place]],FIRST:[Tom BELTRAN)],PHONE:[(   )    -],FAX:[(   )    -],ADDRESS:[[place] [address]  [phone]]],TOKEN:'[id number]',TOKEN:'[id number]'

## 2017-05-03 NOTE — DISCHARGE NOTE ADULT - PATIENT PORTAL LINK FT
“You can access the Follow[employer] Patient Portal, offered by [facility], by registering with the following website: [email or web address]”

## 2017-05-03 NOTE — DISCHARGE NOTE ADULT - MEDICATION SUMMARY - MEDICATIONS TO CHANGE
I will SWITCH the dose or number of times a day I take the medications listed below when I get home from the hospital:    atorvastatin 40 mg oral tablet  -- 1 tab(s) by mouth once a day (at bedtime)

## 2017-05-03 NOTE — DISCHARGE NOTE ADULT - MEDICATION SUMMARY - MEDICATIONS TO TAKE
I will START or STAY ON the medications listed below when I get home from the hospital:    aspirin 81 mg oral tablet  -- 1 tab(s) by mouth once a day  -- Take with food or milk.    -- Indication: For prophylactic measure    atorvastatin 40 mg oral tablet  -- 1 tab(s) by mouth once a day (at bedtime)  -- Indication: For Hypercholesterolemia    levothyroxine 75 mcg (0.075 mg) oral capsule  -- 1 cap(s) by mouth once a day  -- Indication: For Hypothyroidism I will START or STAY ON the medications listed below when I get home from the hospital:    aspirin 81 mg oral tablet  -- 1 tab(s) by mouth once a day  -- Take with food or milk.    -- Indication: For prophylactic measure    Reglan 5 mg oral tablet  -- 1 tab(s) by mouth every 8 hours, As Needed  -- May cause drowsiness.  Alcohol may intensify this effect.  Use care when operating dangerous machinery.  Take medication on an empty stomach 1 hour before or 2 to 3 hours after a meal unless otherwise directed by your doctor.    -- Indication: For Nausea & vomiting    meclizine 12.5 mg oral tablet  -- 1 tab(s) by mouth every 6 hours, As Needed MDD:as need for vertigo  -- May cause drowsiness.  Alcohol may intensify this effect.  Use care when operating dangerous machinery.    -- Indication: For Vertigo    atorvastatin 40 mg oral tablet  -- 1 tab(s) by mouth once a day (at bedtime)  -- Indication: For Hypercholesterolemia    levothyroxine 75 mcg (0.075 mg) oral capsule  -- 1 cap(s) by mouth once a day  -- Indication: For Hypothyroidism

## 2017-05-03 NOTE — CONSULT NOTE ADULT - SUBJECTIVE AND OBJECTIVE BOX
[facility]                                 Deb Snider, & Yadiel                                  [address]                                        [place], [place], [address]                                             [phone]    HISTORY:    The patient is a 56y Female who presented to ED with vertigo of acute onset with headache and nausea/vomitting.  She is currently feeling better and tolerates PO.  She has a history of vertigo.  Neuro eval is called.    PAST MEDICAL & SURGICAL HISTORY:  Hypercholesterolemia  Hypothyroidism  Enlarged thyroid  H/O radioactive iodine thyroid ablation  No significant past surgical history      MEDICATIONS  (STANDING):  pantoprazole  Injectable 40milliGRAM(s) IV Push daily  sodium chloride 0.9%. 1000milliLiter(s) IV Continuous <Continuous>  atorvastatin 40milliGRAM(s) Oral at bedtime  levothyroxine 75MICROGram(s) Oral daily  aspirin 325milliGRAM(s) Oral daily  enoxaparin Injectable 40milliGRAM(s) SubCutaneous daily    MEDICATIONS  (PRN):  ondansetron Injectable 4milliGRAM(s) IV Push every 6 hours PRN Nausea and/or Vomiting  acetaminophen   Tablet 650milliGRAM(s) Oral every 6 hours PRN headache      Allergies    amoxicillin (Urticaria)    Intolerances        SOCIAL HISTORY:  no tob, no etoh, no drugs    FAMILY HISTORY:  No pertinent family history in first degree relatives      ROS:  The patient denies fevers or weight changes.  Denies headache or dizziness.  Denies chest pain.  Denies shortness of breath.  Denies abdominal pain, nausea, or vomiting.  Denies change in urinary pattern.  Denies rash.  Denies recent mood changes.    Exam:  Vital Signs Last 24 Hrs  T(C): 37.1, Max: 37.1 (05-03 @ 08:28)  T(F): 98.7, Max: 98.7 (05-03 @ 08:28)  HR: 78 (69 - 98)  BP: 130/73 (122/70 - 153/91)  BP(mean): --  RR: 19 (18 - 19)  SpO2: 100% (97% - 100%)  General: NAD    Mental status: The patient is awake, alert, and fully oriented. There is no aphasia, speaks english, but Maori is primary language.    Cranial nerves: . Pupils react Symmetrically to light. There is no visual field deficit to confrontation. Extraocular motion is full with no nystagmus. There is no ptosis. Facial sensation is intact. Facial musculature is symmetric. Palate elevates symmetrically. Tongue is midline.    Motor: There is normal bulk and tone.  Strength is 5/5 in the right arm and leg.   Strength is 5/5 in the left arm and leg.    Sensation: Intact to light touch and pin.    Reflexes: 2+ throughout and plantar responses are flexor.    Cerebellar: There is no dysmetria on finger to nose testing.    LABS:                         13.2   8.5   )-----------( 197      ( 03 May 2017 06:22 )             40.4       05-03    145  |  110<H>  |  10.0  ----------------------------<  98  3.8   |  25.0  |  0.39<L>    Ca    8.5<L>      03 May 2017 06:22  Phos  4.0     05-03  Mg     2.3     05-03    TPro  6.5<L>  /  Alb  3.7  /  TBili  0.4  /  DBili  x   /  AST  18  /  ALT  13  /  AlkPhos  81  05-03      PT/INR - ( 02 May 2017 15:26 )   PT: 10.3 sec;   INR: 0.94 ratio         PTT - ( 02 May 2017 15:26 )  PTT:34.4 sec    RADIOLOGY & ADDITIONAL STUDIES:  MRI brain No CVA, no mass, no bleed  MRA head small 2 mm left cavernous ICA aneurysm  MRA neck no sig stenosis

## 2017-05-03 NOTE — DISCHARGE NOTE ADULT - CARE PROVIDER_API CALL
Tom Trimble)  [facility]  [address] [address]  [phone]  Phone: (   )    -  Fax: (   )    -    Han Boyd (MD), Neurology  [address] [place] [address]  Phone: [phone]  Fax: [phone]    Ming Cummings), Neurosurgery  [address] [place] [address]  Phone: [phone]  Fax: [phone]

## 2017-05-03 NOTE — DISCHARGE NOTE ADULT - CARE PLAN
Principal Discharge DX:	CVA (cerebral vascular accident)  Goal:	NO evidence of CVA. Patient should follow up with neurology and PMD  Instructions for follow-up, activity and diet:	Continue aspirin and statin as prescribed.  Follow up at [facility] May 11th at 2:30pm  Secondary Diagnosis:	Vertigo  Goal:	Improved. Asymptomatic. Encourage adequate fluid hydration. Follow up with PMD  Secondary Diagnosis:	Nausea & vomiting  Goal:	Currently without symptoms. Follow up with PMD  Secondary Diagnosis:	Headache  Goal:	Follow up with PMD. Over the counter Tylenol if needed for headache  Secondary Diagnosis:	Mixed hyperlipidemia  Goal:	Continue statin medication as prescribed.  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  Instructions for follow-up, activity and diet:	Patient should follow up with Neurosurgery for repeat MRI and further recommendations. Principal Discharge DX:	CVA (cerebral vascular accident)  Goal:	NO evidence of CVA. Patient should follow up with neurology and PMD  Instructions for follow-up, activity and diet:	Continue aspirin and statin as prescribed.  Follow up at [facility] May 11th at 2:30pm  Secondary Diagnosis:	Vertigo  Goal:	Improved. Asymptomatic. Encourage adequate fluid hydration. Follow up with PMD  Secondary Diagnosis:	Nausea & vomiting  Goal:	Currently without symptoms. Follow up with PMD  Secondary Diagnosis:	Headache  Goal:	Follow up with PMD. Over the counter Tylenol if needed for headache  Secondary Diagnosis:	Mixed hyperlipidemia  Goal:	Continue statin medication as prescribed.  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  Instructions for follow-up, activity and diet:	Patient should follow up with Neurosurgery for repeat MRI and further recommendations. Principal Discharge DX:	CVA (cerebral vascular accident)  Goal:	NO evidence of CVA. Patient should follow up with neurology and PMD  Instructions for follow-up, activity and diet:	Continue aspirin and statin as prescribed.  Follow up at [facility] May 11th at 2:30pm  Secondary Diagnosis:	Vertigo  Goal:	Improved. Asymptomatic. Encourage adequate fluid hydration. Follow up with PMD  Secondary Diagnosis:	Nausea & vomiting  Goal:	Currently without symptoms. Follow up with PMD  Secondary Diagnosis:	Headache  Goal:	Follow up with PMD. Over the counter Tylenol if needed for headache  Secondary Diagnosis:	Mixed hyperlipidemia  Goal:	Continue statin medication as prescribed.  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  Instructions for follow-up, activity and diet:	Patient should follow up with Neurosurgery for repeat MRI and further recommendations.

## 2017-05-03 NOTE — DISCHARGE NOTE ADULT - INSTRUCTIONS
lowfat followup in clinic in 3-5 days after discharge home. enfamil 2-3oz every 3-4hours. breastfeeding PRN.

## 2017-05-04 VITALS
RESPIRATION RATE: 18 BRPM | HEART RATE: 81 BPM | DIASTOLIC BLOOD PRESSURE: 70 MMHG | TEMPERATURE: 98 F | OXYGEN SATURATION: 99 % | SYSTOLIC BLOOD PRESSURE: 128 MMHG

## 2017-05-04 LAB
ANION GAP SERPL CALC-SCNC: 10 MMOL/L — SIGNIFICANT CHANGE UP (ref 5–17)
BUN SERPL-MCNC: 14 MG/DL — SIGNIFICANT CHANGE UP (ref 8–20)
CALCIUM SERPL-MCNC: 8.7 MG/DL — SIGNIFICANT CHANGE UP (ref 8.6–10.2)
CHLORIDE SERPL-SCNC: 106 MMOL/L — SIGNIFICANT CHANGE UP (ref 98–107)
CO2 SERPL-SCNC: 27 MMOL/L — SIGNIFICANT CHANGE UP (ref 22–29)
CREAT SERPL-MCNC: 0.38 MG/DL — LOW (ref 0.5–1.3)
GLUCOSE SERPL-MCNC: 97 MG/DL — SIGNIFICANT CHANGE UP (ref 70–115)
LDH SERPL L TO P-CCNC: 33 U/L — SIGNIFICANT CHANGE UP
MAGNESIUM SERPL-MCNC: 2 MG/DL — SIGNIFICANT CHANGE UP (ref 1.8–2.5)
PHOSPHATE SERPL-MCNC: 3.4 MG/DL — SIGNIFICANT CHANGE UP (ref 2.4–4.7)
POTASSIUM SERPL-MCNC: 4 MMOL/L — SIGNIFICANT CHANGE UP (ref 3.5–5.3)
POTASSIUM SERPL-SCNC: 4 MMOL/L — SIGNIFICANT CHANGE UP (ref 3.5–5.3)
SODIUM SERPL-SCNC: 143 MMOL/L — SIGNIFICANT CHANGE UP (ref 135–145)

## 2017-05-04 PROCEDURE — 84100 ASSAY OF PHOSPHORUS: CPT

## 2017-05-04 PROCEDURE — 84479 ASSAY OF THYROID (T3 OR T4): CPT

## 2017-05-04 PROCEDURE — 84484 ASSAY OF TROPONIN QUANT: CPT

## 2017-05-04 PROCEDURE — 89051 BODY FLUID CELL COUNT: CPT

## 2017-05-04 PROCEDURE — 87205 SMEAR GRAM STAIN: CPT

## 2017-05-04 PROCEDURE — 80048 BASIC METABOLIC PNL TOTAL CA: CPT

## 2017-05-04 PROCEDURE — 93005 ELECTROCARDIOGRAM TRACING: CPT

## 2017-05-04 PROCEDURE — 62270 DX LMBR SPI PNXR: CPT

## 2017-05-04 PROCEDURE — 70545 MR ANGIOGRAPHY HEAD W/DYE: CPT

## 2017-05-04 PROCEDURE — 84480 ASSAY TRIIODOTHYRONINE (T3): CPT

## 2017-05-04 PROCEDURE — 84157 ASSAY OF PROTEIN OTHER: CPT

## 2017-05-04 PROCEDURE — 83735 ASSAY OF MAGNESIUM: CPT

## 2017-05-04 PROCEDURE — 70549 MR ANGIOGRAPH NECK W/O&W/DYE: CPT

## 2017-05-04 PROCEDURE — 99238 HOSP IP/OBS DSCHRG MGMT 30/<: CPT

## 2017-05-04 PROCEDURE — 87070 CULTURE OTHR SPECIMN AEROBIC: CPT

## 2017-05-04 PROCEDURE — 70450 CT HEAD/BRAIN W/O DYE: CPT

## 2017-05-04 PROCEDURE — 93306 TTE W/DOPPLER COMPLETE: CPT

## 2017-05-04 PROCEDURE — 85730 THROMBOPLASTIN TIME PARTIAL: CPT

## 2017-05-04 PROCEDURE — 87483 CNS DNA AMP PROBE TYPE 12-25: CPT

## 2017-05-04 PROCEDURE — 83615 LACTATE (LD) (LDH) ENZYME: CPT

## 2017-05-04 PROCEDURE — 93880 EXTRACRANIAL BILAT STUDY: CPT

## 2017-05-04 PROCEDURE — 36415 COLL VENOUS BLD VENIPUNCTURE: CPT

## 2017-05-04 PROCEDURE — T1013: CPT

## 2017-05-04 PROCEDURE — 80061 LIPID PANEL: CPT

## 2017-05-04 PROCEDURE — 82945 GLUCOSE OTHER FLUID: CPT

## 2017-05-04 PROCEDURE — 83690 ASSAY OF LIPASE: CPT

## 2017-05-04 PROCEDURE — 80053 COMPREHEN METABOLIC PANEL: CPT

## 2017-05-04 PROCEDURE — 84443 ASSAY THYROID STIM HORMONE: CPT

## 2017-05-04 PROCEDURE — 96374 THER/PROPH/DIAG INJ IV PUSH: CPT | Mod: XU

## 2017-05-04 PROCEDURE — 83036 HEMOGLOBIN GLYCOSYLATED A1C: CPT

## 2017-05-04 PROCEDURE — 87476 LYME DIS DNA AMP PROBE: CPT

## 2017-05-04 PROCEDURE — 84436 ASSAY OF TOTAL THYROXINE: CPT

## 2017-05-04 PROCEDURE — 85610 PROTHROMBIN TIME: CPT

## 2017-05-04 PROCEDURE — 70553 MRI BRAIN STEM W/O & W/DYE: CPT

## 2017-05-04 PROCEDURE — 99285 EMERGENCY DEPT VISIT HI MDM: CPT | Mod: 25

## 2017-05-04 PROCEDURE — 85027 COMPLETE CBC AUTOMATED: CPT

## 2017-05-04 PROCEDURE — 72125 CT NECK SPINE W/O DYE: CPT

## 2017-05-04 RX ORDER — METOCLOPRAMIDE HCL 10 MG
1 TABLET ORAL
Qty: 30 | Refills: 0 | OUTPATIENT
Start: 2017-05-04 | End: 2017-05-14

## 2017-05-04 RX ORDER — MECLIZINE HCL 12.5 MG
1 TABLET ORAL
Qty: 40 | Refills: 0 | OUTPATIENT
Start: 2017-05-04 | End: 2017-05-14

## 2017-05-04 RX ORDER — ASPIRIN/CALCIUM CARB/MAGNESIUM 324 MG
1 TABLET ORAL
Qty: 30 | Refills: 2 | OUTPATIENT
Start: 2017-05-04 | End: 2017-08-01

## 2017-05-04 RX ORDER — ATORVASTATIN CALCIUM 80 MG/1
1 TABLET, FILM COATED ORAL
Qty: 30 | Refills: 0 | OUTPATIENT
Start: 2017-05-04 | End: 2017-06-03

## 2017-05-04 RX ADMIN — SODIUM CHLORIDE 100 MILLILITER(S): 9 INJECTION INTRAMUSCULAR; INTRAVENOUS; SUBCUTANEOUS at 12:34

## 2017-05-04 RX ADMIN — Medication 75 MICROGRAM(S): at 05:55

## 2017-05-04 RX ADMIN — PANTOPRAZOLE SODIUM 40 MILLIGRAM(S): 20 TABLET, DELAYED RELEASE ORAL at 12:34

## 2017-05-04 RX ADMIN — Medication 325 MILLIGRAM(S): at 12:34

## 2017-05-04 RX ADMIN — ENOXAPARIN SODIUM 40 MILLIGRAM(S): 100 INJECTION SUBCUTANEOUS at 12:34

## 2017-05-04 NOTE — PROGRESS NOTE ADULT - PROBLEM SELECTOR PLAN 4
- patient says headache has largely resolved   - tylenol PRN  - CSF analysis normal, awaiting herpes, [place]
- resolved  - tylenol PRN  - CSF analysis normal, awaiting herpes, [place]

## 2017-05-04 NOTE — PROGRESS NOTE ADULT - PROBLEM SELECTOR PLAN 1
- patient says vertigo has largely resolved, headache and neck ache has resolved  - CT scan of the had, MRI head, MRA head shows 2mm aneurysm but no bleed  - MRA of the neck is normal  - stroke ruled out  - opening pressure on lumbar tap was elevated (22 cmH20), neurology consult
- cause of vertigo is more likely peripheral  - meclizine 25 mg q 6 hours PRN  - Romberg is negative,  neurological exam is normal, no nystagmus,   -Valentina Hallpike negative

## 2017-05-04 NOTE — PROGRESS NOTE ADULT - ASSESSMENT
Patient is 56 year old female with previous medical history of hyperlipidemia, hyperthyroidism who came into the hospital complaining of vertigo which started this morning at 6 am as she got out of bed and walked to the bathroom.  Patient says vertigo is excacerbated by walking to the bathroom. Patient has had similar episodes of vertigo before, however all workup was negative.
The patient is a 56y Female with vertigo.  CSF studies unremarkable.  Opening pressure mildly elevated, could be due to positioning (if knees still bent in lat decub position) or pain/discomfort.  She does not have signs or symptoms of intracranial hypertension.  ok from neuro for discharge  she is welcome to follow up in office if symptoms recur    aHn Boyd MD, PhD   [id number]
Patient is 56 year old female with previous medical history of hyperlipidemia, hyperthyroidism who came into the hospital complaining of vertigo. Symptoms with improvement. Reports minimal vertigo today. Neurology recs appreciated and note.  Patient has CSF culture pending.  DC planning. Patient will need outpatient follow up with neurosurgery.

## 2017-05-04 NOTE — PROGRESS NOTE ADULT - PROBLEM SELECTOR PLAN 6
- TSH is low, however t4 and t3 are normal   - continue with levothyroxine
- TSH is low, however t4 and t3 are normal   - continue with levothyroxine

## 2017-05-04 NOTE — PROGRESS NOTE ADULT - ATTENDING COMMENTS
I have personally seen and examined the patient. I agree with the above history, physical and plan which I have reviewed and edited as appropriate. Patient awake, alert, oriented, calm, comfortable, no complaints except for some dizziness with bending down and ambulation. Cardiopulmonary exam is within normal limits. Will continue meclizine.  Anticipate d/c in am if patient's condition improves.
I have personally seen and examined the patient. I agree with the above history, physical and plan which I have reviewed and edited as appropriate. Patient awake, alert, oriented, calm, comfortable, no complaints except that still with some dizziness on ambulation. Cardiopulmonary exam is within normal limits. Will send home on reglan for nausea / vomiting and meclizine for vertigo.  Patient was admitted for vertigo. Meningitis / encephalitis / pseudotumor cerebri has been ruled out.  Patient was told that will need to follow with neurosurgery because of incidental finding of small carvenous ICA aneurysm which needs to be followed with serial MRAs.

## 2017-05-04 NOTE — PROGRESS NOTE ADULT - PROBLEM SELECTOR PLAN 8
- hold lovenox  - will give intermittent pneumatic compression
- hold lovenox  - will give intermittent pneumatic compression

## 2017-05-04 NOTE — PROGRESS NOTE ADULT - PROBLEM SELECTOR PLAN 3
- ondansentron 4 mg q 6 hours   - meclizine 25 mg q 6 hours in case of vertigo
- ondansentron 4 mg q 6 hours   - meclizine 25 mg q 6 hours in case of vertigo

## 2017-05-04 NOTE — PROGRESS NOTE ADULT - SUBJECTIVE AND OBJECTIVE BOX
[facility]                                 Deb Snider, & Yadiel                                  [address]                                        [place], [place], [address]                                             [phone]      Vital signs:  T(C): 36.8, Max: 37 (05-03 @ 13:52)  HR: 60 (58 - 63)  BP: 118/71 (109/52 - 130/62)  RR: 18 (17 - 20)  SpO2: 98% (98% - 100%)  Wt(kg): --    Exam:    No new complaints.  vertigo largely resolved  Awake and alert.  speech language intact  Pupils react.  Face symmetric smile and sensation  hearing symmetric  tongue ML  5/5 power in 4 ext  no drift  intact FT x 4 ext    CSF studies normal cell count, protein, glucose, and no growth in culture
CC: irisleigh ann    Patient is 56 year old female with pmh of hyperlipidemia, hypothyroidism admitted with dizziness vomiting and headache. Patient reports significant improvement from admission. Reports minimal dizziness this morning. Denies any nausea or vomiting. She is ambulating, urinating and stooling. Good PO. She is afebrile and has no complaints today.       Vital Signs Last 24 Hrs  ICU Vital Signs Last 24 Hrs  T(C): 36.8, Max: 37.1 (05-03 @ 08:28)  T(F): 98.3, Max: 98.7 (05-03 @ 08:28)  HR: 60 (58 - 78)  BP: 118/71 (109/52 - 130/73)  RR: 18 (17 - 20)  SpO2: 98% (98% - 100%)      Physical exam:   General: mild distress due to nausea, lying in bed, well developed, well groomed   HEENT: EOMI/CUATE, throat no exudates, no erythema, tympanic membrane on the left with normal light reflex, tympanic membrane on the right with absent light reflex, erythema of the external acustic meatus, however no bulging of membrane, no air fluid levels .  No nystagmus. No hearing loss. Brudiznski and Kerning is negative.   Neck: No JVD, supple, normal thyroid gland, no carotid bruit  Cardiac: s1/s2/rrr/no s4 or s3, no murmurs  Pulmonary:   clear to auscultation bilaterally, no rales, no ronchi  Abdominal: soft, no guarding or rebound, bowel sounds positive  Extremities: no calf tednerness, no edema, no cyanosis   Neurological: alert and oriented x 3, Romberg (-), normal gait, no disdaochokinesis, finger to nose is normal.  MMSE = 28/30  NIHS score=0.    Cranial Nerves II-XII:    II:  Visual acuity is normal for age ; Visual fields are full to confrontation; Pupils are equal, round, and reactive to light.  III, IV, VI:  Extraocular movements are intact without nystagmus.  V:  Facial sensation is intact in the V1-V3 distribution bilaterally.  VII:  Face........  VIII:  Hearing is grossly intact  IX, X, XII:  speech is clear  XI:  Head turning and shoulder shrug are intact.  - Motor: 5/5 in all extremities     - Reflexes:2+ patellar, achillies   - Sensory:  Symmetric to light touch  - Coordination:  Finger-nose-finger is normal. Rapid alternating hand and foot  movements are intact both sides. Dexterity appears normal- boths sides.     Labs:Am labs are pending.                           13.2   8.5   )-----------( 197      ( 03 May 2017 06:22 )             40.4       05-03    145  |  110<H>  |  10.0  ----------------------------<  98  3.8   |  25.0  |  0.39<L>    Ca    8.5<L>      03 May 2017 06:22  Phos  4.0     05-03  Mg     2.3     05-03    TPro  6.5<L>  /  Alb  3.7  /  TBili  0.4  /  DBili  x   /  AST  18  /  ALT  13  /  AlkPhos  81  05-03    CSF:Culture pending    CSF PCR Panel (05.03.17 @ 19:51)    CSF PCR Result: NotDetec: The meningitis/encephalitis (ME) panel (CSFPCR) is a PCR based assay that  screens for: Escherichia coli; Haemophilus influenzae; Listeria  monocytogenes; Neisseria meningitidis; Streptococcus agalactiae;  Streptococcus pneumoniae; CMV; Enterovirus; HNotDetec: SV-1; HSV-2; Human  herpesvirus 6; Parechovirus; VZV and Cryptococcus. Result should be  interpreted in context of clinical presentation, imaging and other lab  tests. Positive predictive value may be lower in patients with normal CSF  chemistry and cNotDetec: ell count.    Cerebrospinal Fluid Cell Count-1 (05.03.17 @ 01:50)    CSF Segmented Neutrophils: N/A %    CSF Appearance: Clear    CSF Color: No Color    Total Nucleated Cell Count, CSF: 1 /uL    Radiology    MRI Head  IMPRESSION: No acute infarction    MRA:Neck  A left-sided aortic arch is demonstrated. There is normal relationship to   the great vessels. The common carotid arteries, internal carotid arteries   and vertebral arteries are normal in caliber. No evidence of stenosis,   occlusion or saccular aneurysm dilation. The vertebral arteries are   codominant.    MRA:Head  FINDINGS:  2 mm lateral oriented left cavernous carotid artery aneurysm on image   124, series 3.  The [place] and vertebrobasilar system are normal without   evidence of stenosis, occlusion or additional saccular aneurysm dilation.   No evidence for arterial venous malformation. The vertebral arteries are   codominant.    MEDICATIONS  (STANDING):  pantoprazole  Injectable 40milliGRAM(s) IV Push daily  sodium chloride 0.9%. 1000milliLiter(s) IV Continuous <Continuous>  atorvastatin 40milliGRAM(s) Oral at bedtime  levothyroxine 75MICROGram(s) Oral daily  aspirin 325milliGRAM(s) Oral daily  enoxaparin Injectable 40milliGRAM(s) SubCutaneous daily    MEDICATIONS  (PRN):  ondansetron Injectable 4milliGRAM(s) IV Push every 6 hours PRN Nausea and/or Vomiting  acetaminophen   Tablet 650milliGRAM(s) Oral every 6 hours PRN headache
CC: diziness    Patient is 56 year old female with pmh of hyperlipidemia, hypothyroidism admitted with diziness, vomiting and headache. The patient says that this morning she feels a lot better, says her headache and neck ache has mostly resolved. And she also says she is no longer vomiting.  The patient is urinating, has not ambulated yet,  oral intake (+) drinking liquids only, bowel movements two days ago.     Vitals:   Vital Signs Last 24 Hrs  T(C): 36.9, Max: 36.9 (05-02 @ 17:00)  T(F): 98.5, Max: 98.5 (05-02 @ 17:00)  HR: 98 (69 - 98)  BP: 140/62 (122/70 - 153/91)  BP(mean): --  RR: 18 (16 - 18)  SpO2: 98% (97% - 99%)      Physical exam:   General: mild distress due to nausea, lying in bed, well developed, well groomed   HEENT: EOMI/CUATE, throat no exudates, no erythema, tympanic membrane on the left with normal light reflex, tympanic membrane on the right with absent light reflex, erythema of the external acustic meatus, however no bulging of membrane, no air fluid levels .  No nystagmus. No hearing loss. Brudiznski and Kerning is negative.   Neck: No JVD, supple, normal thyroid gland, no carotid bruit  Cardiac: s1/s2/rrr/no s4 or s3, no murmurs  Pulmonary:   clear to auscultation bilaterally, no rales, no ronchi  Abdominal: soft, no guarding or rebound, bowel sounds positive  Extremities: no calf tednerness, no edema, no cyanosis   Neurological: alert and oriented x 3, cranial nerves 2-12 intact, Romberg (-), normal gait, no disdaochokinesis, finger to nose is normal.  MMSE = 28/30  NIHS score=0.    Labs:                          13.2   8.5   )-----------( 197      ( 03 May 2017 06:22 )             40.4       05-03    145  |  110<H>  |  10.0  ----------------------------<  98  3.8   |  25.0  |  0.39<L>    Ca    8.5<L>      03 May 2017 06:22  Phos  4.0     05-03  Mg     2.3     05-03    TPro  6.5<L>  /  Alb  3.7  /  TBili  0.4  /  DBili  x   /  AST  18  /  ALT  13  /  AlkPhos  81  05-03

## 2017-05-06 LAB
B BURGDOR DNA SPEC QL NAA+PROBE: NEGATIVE — SIGNIFICANT CHANGE UP
CULTURE RESULTS: SIGNIFICANT CHANGE UP
SPECIMEN SOURCE: SIGNIFICANT CHANGE UP

## 2019-01-06 ENCOUNTER — EMERGENCY (EMERGENCY)
Facility: HOSPITAL | Age: 59
LOS: 1 days | Discharge: HOME | End: 2019-01-06
Attending: EMERGENCY MEDICINE
Payer: MEDICAID

## 2019-01-06 VITALS
HEIGHT: 58 IN | OXYGEN SATURATION: 98 % | WEIGHT: 141.98 LBS | DIASTOLIC BLOOD PRESSURE: 80 MMHG | RESPIRATION RATE: 20 BRPM | SYSTOLIC BLOOD PRESSURE: 150 MMHG | HEART RATE: 81 BPM | TEMPERATURE: 99 F

## 2019-01-06 DIAGNOSIS — Z92.3 PERSONAL HISTORY OF IRRADIATION: Chronic | ICD-10-CM

## 2019-01-06 PROBLEM — E03.9 HYPOTHYROIDISM, UNSPECIFIED: Chronic | Status: ACTIVE | Noted: 2017-05-02

## 2019-01-06 PROBLEM — E78.00 PURE HYPERCHOLESTEROLEMIA, UNSPECIFIED: Chronic | Status: ACTIVE | Noted: 2017-05-02

## 2019-01-06 LAB
ALBUMIN SERPL ELPH-MCNC: 4.8 G/DL — SIGNIFICANT CHANGE UP (ref 3.3–5.2)
ALP SERPL-CCNC: 107 U/L — SIGNIFICANT CHANGE UP (ref 40–120)
ALT FLD-CCNC: 15 U/L — SIGNIFICANT CHANGE UP
ANION GAP SERPL CALC-SCNC: 13 MMOL/L — SIGNIFICANT CHANGE UP (ref 5–17)
APPEARANCE UR: ABNORMAL
AST SERPL-CCNC: 21 U/L — SIGNIFICANT CHANGE UP
BACTERIA # UR AUTO: ABNORMAL
BASOPHILS # BLD AUTO: 0 K/UL — SIGNIFICANT CHANGE UP (ref 0–0.2)
BASOPHILS NFR BLD AUTO: 0.4 % — SIGNIFICANT CHANGE UP (ref 0–2)
BILIRUB SERPL-MCNC: 0.3 MG/DL — LOW (ref 0.4–2)
BILIRUB UR-MCNC: NEGATIVE — SIGNIFICANT CHANGE UP
BUN SERPL-MCNC: 15 MG/DL — SIGNIFICANT CHANGE UP (ref 8–20)
CALCIUM SERPL-MCNC: 9 MG/DL — SIGNIFICANT CHANGE UP (ref 8.6–10.2)
CHLORIDE SERPL-SCNC: 99 MMOL/L — SIGNIFICANT CHANGE UP (ref 98–107)
CO2 SERPL-SCNC: 26 MMOL/L — SIGNIFICANT CHANGE UP (ref 22–29)
COLOR SPEC: ABNORMAL
CREAT SERPL-MCNC: 0.42 MG/DL — LOW (ref 0.5–1.3)
DIFF PNL FLD: ABNORMAL
EOSINOPHIL # BLD AUTO: 0.4 K/UL — SIGNIFICANT CHANGE UP (ref 0–0.5)
EOSINOPHIL NFR BLD AUTO: 3.1 % — SIGNIFICANT CHANGE UP (ref 0–6)
EPI CELLS # UR: SIGNIFICANT CHANGE UP
GLUCOSE SERPL-MCNC: 99 MG/DL — SIGNIFICANT CHANGE UP (ref 70–115)
GLUCOSE UR QL: NEGATIVE MG/DL — SIGNIFICANT CHANGE UP
HCT VFR BLD CALC: 43 % — SIGNIFICANT CHANGE UP (ref 37–47)
HGB BLD-MCNC: 14 G/DL — SIGNIFICANT CHANGE UP (ref 12–16)
KETONES UR-MCNC: ABNORMAL
LEUKOCYTE ESTERASE UR-ACNC: ABNORMAL
LYMPHOCYTES # BLD AUTO: 18.8 % — LOW (ref 20–55)
LYMPHOCYTES # BLD AUTO: 2.3 K/UL — SIGNIFICANT CHANGE UP (ref 1–4.8)
MCHC RBC-ENTMCNC: 31.6 PG — HIGH (ref 27–31)
MCHC RBC-ENTMCNC: 32.6 G/DL — SIGNIFICANT CHANGE UP (ref 32–36)
MCV RBC AUTO: 97.1 FL — SIGNIFICANT CHANGE UP (ref 81–99)
MONOCYTES # BLD AUTO: 0.8 K/UL — SIGNIFICANT CHANGE UP (ref 0–0.8)
MONOCYTES NFR BLD AUTO: 6.6 % — SIGNIFICANT CHANGE UP (ref 3–10)
NEUTROPHILS # BLD AUTO: 8.5 K/UL — HIGH (ref 1.8–8)
NEUTROPHILS NFR BLD AUTO: 70.5 % — SIGNIFICANT CHANGE UP (ref 37–73)
NITRITE UR-MCNC: POSITIVE
PH UR: 7 — SIGNIFICANT CHANGE UP (ref 5–8)
PLATELET # BLD AUTO: 284 K/UL — SIGNIFICANT CHANGE UP (ref 150–400)
POTASSIUM SERPL-MCNC: 4 MMOL/L — SIGNIFICANT CHANGE UP (ref 3.5–5.3)
POTASSIUM SERPL-SCNC: 4 MMOL/L — SIGNIFICANT CHANGE UP (ref 3.5–5.3)
PROT SERPL-MCNC: 7.8 G/DL — SIGNIFICANT CHANGE UP (ref 6.6–8.7)
PROT UR-MCNC: 100 MG/DL
RBC # BLD: 4.43 M/UL — SIGNIFICANT CHANGE UP (ref 4.4–5.2)
RBC # FLD: 12.6 % — SIGNIFICANT CHANGE UP (ref 11–15.6)
RBC CASTS # UR COMP ASSIST: ABNORMAL /HPF (ref 0–4)
SODIUM SERPL-SCNC: 138 MMOL/L — SIGNIFICANT CHANGE UP (ref 135–145)
SP GR SPEC: 1 — LOW (ref 1.01–1.02)
UROBILINOGEN FLD QL: NEGATIVE MG/DL — SIGNIFICANT CHANGE UP
WBC # BLD: 12 K/UL — HIGH (ref 4.8–10.8)
WBC # FLD AUTO: 12 K/UL — HIGH (ref 4.8–10.8)
WBC UR QL: ABNORMAL

## 2019-01-06 PROCEDURE — 85027 COMPLETE CBC AUTOMATED: CPT

## 2019-01-06 PROCEDURE — 96361 HYDRATE IV INFUSION ADD-ON: CPT

## 2019-01-06 PROCEDURE — T1013: CPT

## 2019-01-06 PROCEDURE — 99284 EMERGENCY DEPT VISIT MOD MDM: CPT | Mod: 25

## 2019-01-06 PROCEDURE — 81001 URINALYSIS AUTO W/SCOPE: CPT

## 2019-01-06 PROCEDURE — 99284 EMERGENCY DEPT VISIT MOD MDM: CPT

## 2019-01-06 PROCEDURE — 96365 THER/PROPH/DIAG IV INF INIT: CPT

## 2019-01-06 PROCEDURE — 80053 COMPREHEN METABOLIC PANEL: CPT

## 2019-01-06 PROCEDURE — 87086 URINE CULTURE/COLONY COUNT: CPT

## 2019-01-06 PROCEDURE — 36415 COLL VENOUS BLD VENIPUNCTURE: CPT

## 2019-01-06 PROCEDURE — 87186 SC STD MICRODIL/AGAR DIL: CPT

## 2019-01-06 RX ORDER — PHENAZOPYRIDINE HCL 100 MG
1 TABLET ORAL
Qty: 6 | Refills: 0 | OUTPATIENT
Start: 2019-01-06 | End: 2019-01-07

## 2019-01-06 RX ORDER — LEVOTHYROXINE SODIUM 125 MCG
1 TABLET ORAL
Qty: 0 | Refills: 0 | COMMUNITY

## 2019-01-06 RX ORDER — SODIUM CHLORIDE 9 MG/ML
1000 INJECTION INTRAMUSCULAR; INTRAVENOUS; SUBCUTANEOUS
Qty: 0 | Refills: 0 | Status: DISCONTINUED | OUTPATIENT
Start: 2019-01-06 | End: 2019-01-11

## 2019-01-06 RX ORDER — CEFPODOXIME PROXETIL 100 MG
1 TABLET ORAL
Qty: 14 | Refills: 0 | OUTPATIENT
Start: 2019-01-06 | End: 2019-01-12

## 2019-01-06 RX ORDER — CEFTRIAXONE 500 MG/1
1 INJECTION, POWDER, FOR SOLUTION INTRAMUSCULAR; INTRAVENOUS ONCE
Qty: 0 | Refills: 0 | Status: COMPLETED | OUTPATIENT
Start: 2019-01-06 | End: 2019-01-06

## 2019-01-06 RX ORDER — ACETAMINOPHEN 500 MG
975 TABLET ORAL ONCE
Qty: 0 | Refills: 0 | Status: COMPLETED | OUTPATIENT
Start: 2019-01-06 | End: 2019-01-06

## 2019-01-06 RX ADMIN — CEFTRIAXONE 100 GRAM(S): 500 INJECTION, POWDER, FOR SOLUTION INTRAMUSCULAR; INTRAVENOUS at 14:04

## 2019-01-06 RX ADMIN — CEFTRIAXONE 1 GRAM(S): 500 INJECTION, POWDER, FOR SOLUTION INTRAMUSCULAR; INTRAVENOUS at 14:30

## 2019-01-06 RX ADMIN — SODIUM CHLORIDE 150 MILLILITER(S): 9 INJECTION INTRAMUSCULAR; INTRAVENOUS; SUBCUTANEOUS at 14:04

## 2019-01-06 RX ADMIN — Medication 975 MILLIGRAM(S): at 14:05

## 2019-01-06 RX ADMIN — SODIUM CHLORIDE 1000 MILLILITER(S): 9 INJECTION INTRAMUSCULAR; INTRAVENOUS; SUBCUTANEOUS at 16:00

## 2019-01-06 NOTE — ED STATDOCS - OBJECTIVE STATEMENT
59 y/o F with hx of born with one kidney (unsure which side), thyroid removal, hypothyroidism, hypercholesteremia, and enlarged thyroid presents to ED c/o hematuria, with associated RLQ back pain, suprapubic pain upon urination, chills, and dysuria. Pt has been able to tolerate PO intake. Pt is allergic to amoxicillin; states she gets itchy afterwards. Pt is currently on ASA. Pt is a non-smoker, does not consume ETOH, and does not use drugs. Denies cough, SOB, CP, abdominal pain, throat hoarseness, fever, ear pain, D/N/V, and cardiac hx. No further complaints at this time. 59 y/o F with hx of born with one kidney (unsure which side), thyroid removal, hypothyroidism, hypercholesteremia, and enlarged thyroid presents to ED c/o hematuria, with associated RLQ back pain, suprapubic pain upon urination, chills, and dysuria. Pt has been able to tolerate PO intake. Pt is allergic to amoxicillin; states she gets itchy afterwards. Pt is currently on ASA. Pt is a non-smoker, does not consume ETOH, and does not use drugs. Denies cough, SOB, CP, abdominal pain, throat hoarseness, fever, ear pain, N/V/D, and cardiac hx. No further complaints at this time.

## 2019-01-06 NOTE — ED STATDOCS - PHYSICAL EXAMINATION
Constitutional: in no apparent distress, speaking in full sentences  HEENT: neck supple, FROM, tongue is pink, moist and midline  EYES: non-injected, non-icteric, PERRL, EOMI  RESPIRATORY: lungs clear  CARDIAC: S1 S2, regular rate, moving chest wall symmetrically, no pedal edema  GI: bowel sounds present, suprapubic tenderness to palpation on exam  : no CVA tenderness  MSK: spine is midline, no calf tenderness  SKIN: no jaundice  NEURO: awake and alert

## 2019-01-06 NOTE — ED STATDOCS - MEDICAL DECISION MAKING DETAILS
Hemorraghic sist itis eval for pyelo, B1 creatine for renal function, and UA for culture and sensitivities, IV abx in ED, check and re-assess.

## 2019-01-06 NOTE — ED STATDOCS - ATTENDING CONTRIBUTION TO CARE
I, Reyes Santillan, performed the initial face to face bedside interview with this patient regarding history of present illness, review of symptoms and relevant past medical, social and family history.  I completed an independent physical examination.  I was the initial provider who evaluated this patient. I have signed out the follow up of any pending tests (i.e. labs, radiological studies) to the ACP.  I have communicated the patient’s plan of care and disposition with the ACP.

## 2019-01-08 LAB
CULTURE RESULTS: SIGNIFICANT CHANGE UP
SPECIMEN SOURCE: SIGNIFICANT CHANGE UP

## 2019-01-09 LAB
-  AMIKACIN: SIGNIFICANT CHANGE UP
-  AMPICILLIN/SULBACTAM: SIGNIFICANT CHANGE UP
-  AMPICILLIN: SIGNIFICANT CHANGE UP
-  AZTREONAM: SIGNIFICANT CHANGE UP
-  CEFAZOLIN: SIGNIFICANT CHANGE UP
-  CEFEPIME: SIGNIFICANT CHANGE UP
-  CEFOXITIN: SIGNIFICANT CHANGE UP
-  CEFTRIAXONE: SIGNIFICANT CHANGE UP
-  CIPROFLOXACIN: SIGNIFICANT CHANGE UP
-  ERTAPENEM: SIGNIFICANT CHANGE UP
-  GENTAMICIN: SIGNIFICANT CHANGE UP
-  IMIPENEM: SIGNIFICANT CHANGE UP
-  LEVOFLOXACIN: SIGNIFICANT CHANGE UP
-  MEROPENEM: SIGNIFICANT CHANGE UP
-  NITROFURANTOIN: SIGNIFICANT CHANGE UP
-  PIPERACILLIN/TAZOBACTAM: SIGNIFICANT CHANGE UP
-  TIGECYCLINE: SIGNIFICANT CHANGE UP
-  TOBRAMYCIN: SIGNIFICANT CHANGE UP
-  TRIMETHOPRIM/SULFAMETHOXAZOLE: SIGNIFICANT CHANGE UP
METHOD TYPE: SIGNIFICANT CHANGE UP
ORGANISM # SPEC MICROSCOPIC CNT: SIGNIFICANT CHANGE UP
ORGANISM # SPEC MICROSCOPIC CNT: SIGNIFICANT CHANGE UP

## 2019-04-24 ENCOUNTER — EMERGENCY (EMERGENCY)
Facility: HOSPITAL | Age: 59
LOS: 1 days | Discharge: HOME | End: 2019-04-24
Attending: EMERGENCY MEDICINE
Payer: MEDICAID

## 2019-04-24 VITALS
SYSTOLIC BLOOD PRESSURE: 135 MMHG | DIASTOLIC BLOOD PRESSURE: 83 MMHG | HEART RATE: 87 BPM | OXYGEN SATURATION: 96 % | RESPIRATION RATE: 20 BRPM | TEMPERATURE: 98 F

## 2019-04-24 VITALS
HEIGHT: 58 IN | TEMPERATURE: 98 F | OXYGEN SATURATION: 95 % | WEIGHT: 139.99 LBS | HEART RATE: 84 BPM | RESPIRATION RATE: 20 BRPM | DIASTOLIC BLOOD PRESSURE: 78 MMHG | SYSTOLIC BLOOD PRESSURE: 143 MMHG

## 2019-04-24 DIAGNOSIS — Z92.3 PERSONAL HISTORY OF IRRADIATION: Chronic | ICD-10-CM

## 2019-04-24 LAB
ALBUMIN SERPL ELPH-MCNC: 4.6 G/DL — SIGNIFICANT CHANGE UP (ref 3.3–5.2)
ALP SERPL-CCNC: 103 U/L — SIGNIFICANT CHANGE UP (ref 40–120)
ALT FLD-CCNC: 17 U/L — SIGNIFICANT CHANGE UP
ANION GAP SERPL CALC-SCNC: 13 MMOL/L — SIGNIFICANT CHANGE UP (ref 5–17)
AST SERPL-CCNC: 25 U/L — SIGNIFICANT CHANGE UP
BASOPHILS # BLD AUTO: 0 K/UL — SIGNIFICANT CHANGE UP (ref 0–0.2)
BASOPHILS NFR BLD AUTO: 0.2 % — SIGNIFICANT CHANGE UP (ref 0–2)
BILIRUB SERPL-MCNC: 0.4 MG/DL — SIGNIFICANT CHANGE UP (ref 0.4–2)
BUN SERPL-MCNC: 12 MG/DL — SIGNIFICANT CHANGE UP (ref 8–20)
CALCIUM SERPL-MCNC: 9.7 MG/DL — SIGNIFICANT CHANGE UP (ref 8.6–10.2)
CHLORIDE SERPL-SCNC: 101 MMOL/L — SIGNIFICANT CHANGE UP (ref 98–107)
CO2 SERPL-SCNC: 29 MMOL/L — SIGNIFICANT CHANGE UP (ref 22–29)
CREAT SERPL-MCNC: 0.49 MG/DL — LOW (ref 0.5–1.3)
EOSINOPHIL # BLD AUTO: 0 K/UL — SIGNIFICANT CHANGE UP (ref 0–0.5)
EOSINOPHIL NFR BLD AUTO: 0 % — SIGNIFICANT CHANGE UP (ref 0–6)
GLUCOSE SERPL-MCNC: 113 MG/DL — SIGNIFICANT CHANGE UP (ref 70–115)
HCT VFR BLD CALC: 43.9 % — SIGNIFICANT CHANGE UP (ref 37–47)
HGB BLD-MCNC: 14.7 G/DL — SIGNIFICANT CHANGE UP (ref 12–16)
LYMPHOCYTES # BLD AUTO: 1 K/UL — SIGNIFICANT CHANGE UP (ref 1–4.8)
LYMPHOCYTES # BLD AUTO: 11.6 % — LOW (ref 20–55)
MCHC RBC-ENTMCNC: 31.9 PG — HIGH (ref 27–31)
MCHC RBC-ENTMCNC: 33.5 G/DL — SIGNIFICANT CHANGE UP (ref 32–36)
MCV RBC AUTO: 95.2 FL — SIGNIFICANT CHANGE UP (ref 81–99)
MONOCYTES # BLD AUTO: 0.2 K/UL — SIGNIFICANT CHANGE UP (ref 0–0.8)
MONOCYTES NFR BLD AUTO: 2.2 % — LOW (ref 3–10)
NEUTROPHILS # BLD AUTO: 7 K/UL — SIGNIFICANT CHANGE UP (ref 1.8–8)
NEUTROPHILS NFR BLD AUTO: 85.3 % — HIGH (ref 37–73)
PLATELET # BLD AUTO: 278 K/UL — SIGNIFICANT CHANGE UP (ref 150–400)
POTASSIUM SERPL-MCNC: 4 MMOL/L — SIGNIFICANT CHANGE UP (ref 3.5–5.3)
POTASSIUM SERPL-SCNC: 4 MMOL/L — SIGNIFICANT CHANGE UP (ref 3.5–5.3)
PROT SERPL-MCNC: 7.9 G/DL — SIGNIFICANT CHANGE UP (ref 6.6–8.7)
RBC # BLD: 4.61 M/UL — SIGNIFICANT CHANGE UP (ref 4.4–5.2)
RBC # FLD: 12.6 % — SIGNIFICANT CHANGE UP (ref 11–15.6)
SODIUM SERPL-SCNC: 143 MMOL/L — SIGNIFICANT CHANGE UP (ref 135–145)
TROPONIN T SERPL-MCNC: <0.01 NG/ML — SIGNIFICANT CHANGE UP (ref 0–0.06)
WBC # BLD: 8.2 K/UL — SIGNIFICANT CHANGE UP (ref 4.8–10.8)
WBC # FLD AUTO: 8.2 K/UL — SIGNIFICANT CHANGE UP (ref 4.8–10.8)

## 2019-04-24 PROCEDURE — 36415 COLL VENOUS BLD VENIPUNCTURE: CPT

## 2019-04-24 PROCEDURE — 70450 CT HEAD/BRAIN W/O DYE: CPT

## 2019-04-24 PROCEDURE — 99284 EMERGENCY DEPT VISIT MOD MDM: CPT | Mod: 25

## 2019-04-24 PROCEDURE — 93010 ELECTROCARDIOGRAM REPORT: CPT

## 2019-04-24 PROCEDURE — 70450 CT HEAD/BRAIN W/O DYE: CPT | Mod: 26

## 2019-04-24 PROCEDURE — 80053 COMPREHEN METABOLIC PANEL: CPT

## 2019-04-24 PROCEDURE — 93005 ELECTROCARDIOGRAM TRACING: CPT

## 2019-04-24 PROCEDURE — 85027 COMPLETE CBC AUTOMATED: CPT

## 2019-04-24 PROCEDURE — T1013: CPT

## 2019-04-24 PROCEDURE — 96374 THER/PROPH/DIAG INJ IV PUSH: CPT

## 2019-04-24 PROCEDURE — 84484 ASSAY OF TROPONIN QUANT: CPT

## 2019-04-24 PROCEDURE — 99284 EMERGENCY DEPT VISIT MOD MDM: CPT

## 2019-04-24 RX ORDER — MECLIZINE HCL 12.5 MG
50 TABLET ORAL ONCE
Qty: 0 | Refills: 0 | Status: COMPLETED | OUTPATIENT
Start: 2019-04-24 | End: 2019-04-24

## 2019-04-24 RX ORDER — METOCLOPRAMIDE HCL 10 MG
10 TABLET ORAL ONCE
Qty: 0 | Refills: 0 | Status: COMPLETED | OUTPATIENT
Start: 2019-04-24 | End: 2019-04-24

## 2019-04-24 RX ORDER — MECLIZINE HCL 12.5 MG
1 TABLET ORAL
Qty: 9 | Refills: 0 | OUTPATIENT
Start: 2019-04-24 | End: 2019-04-26

## 2019-04-24 RX ADMIN — Medication 50 MILLIGRAM(S): at 15:01

## 2019-04-24 RX ADMIN — Medication 10 MILLIGRAM(S): at 15:02

## 2019-04-24 NOTE — ED STATDOCS - OBJECTIVE STATEMENT
59 y/o F pt presents to the ED c/o dizziness beginning this morning.  Pt reports dizziness, HA, nausea, vomiting, palpitations, and generalized chest pain.  Pt had similar symptoms one year ago, came into [facility] ED, was told she had vertigo, was not given any meds at the time.  Denies fever, chills, SOB, diarrhea, rash.  No further acute complaints at this time.  Patient's history was obtained with the help of Bruna, an ED .

## 2019-04-24 NOTE — ED STATDOCS - CARE PLAN
Principal Discharge DX:	Dizziness  Secondary Diagnosis:	Vomiting alone  Secondary Diagnosis:	Chest pain

## 2019-04-24 NOTE — ED STATDOCS - CARE PROVIDER_API CALL
Benny Gomez)  Neurology  [address] [place] [address]  Phone: [phone]  Fax: [phone]  Follow Up Time:

## 2019-04-24 NOTE — ED ADULT NURSE NOTE - CHPI ED NUR SYMPTOMS NEG
no dysuria/no chills/no fever/no hematuria/no diarrhea/no burning urination/no abdominal distension/no blood in stool

## 2019-04-24 NOTE — ED ADULT NURSE NOTE - OBJECTIVE STATEMENT
pt presents to ED with nausea/vomiting, dizziness and chest discomfort when vomiting. Pt with hx of vertigo. Chest pain only occur during vomiting. Denies sob, diarrhea, fever, lightheadedness, numbness and tingling.

## 2019-04-24 NOTE — ED STATDOCS - ATTENDING CONTRIBUTION TO CARE
I, Dejuan Knutson, performed the initial face to face bedside interview with this patient regarding history of present illness, review of symptoms and relevant past medical, social and family history.  I completed an independent physical examination.  I was the initial provider who evaluated this patient. I have signed out the follow up of any pending tests (i.e. labs, radiological studies) to the ACP.  I have communicated the patient’s plan of care and disposition with the ACP.  The history, relevant review of systems, past medical and surgical history, medical decision making, and physical examination was documented by the scribe in my presence and I attest to the accuracy of the documentation.

## 2019-04-24 NOTE — ED STATDOCS - PROGRESS NOTE DETAILS
SUZANNE Davalos NOTE: Pt evaluated at bedside. Pt reports her chest hurts only when vomiting, vomited 4 times this morning described as orange colored food particles, NBNB. Pt evaluated prior by intake physician. Otherwise HPI/PE/ROS as noted above. Will follow up plan per intake physician. ED  Bruna. SUZANNE Davalos NOTE: Pt is neuro intact, ambulating with steady gait, no dizziness, no CP. Patient stable for discharge, resolution in symptoms s/p medications, vital signs stable, tolerating PO, ambulatory in ED.  Discussion with pt includes but is not limited to: results, plan, proper medication use and side effects, and return precautions. Patient will follow up with their PMD in 1-2 days and any specialists discussed. Advised patient to seek immediate medical attention for any new/worsening symptoms or concerns.  Patient provided with ample opportunity to ask questions which were answered to the fullest extent. Patient verbalized understanding and agreement of plan. ED  Bruna

## 2019-05-20 NOTE — PATIENT PROFILE ADULT. - AS SC BRADEN MOISTURE
(3) occasionally moist Keystone Flap Text: The defect edges were debeveled with a #15 scalpel blade.  Given the location of the defect, shape of the defect a keystone flap was deemed most appropriate.  Using a sterile surgical marker, an appropriate keystone flap was drawn incorporating the defect, outlining the appropriate donor tissue and placing the expected incisions within the relaxed skin tension lines where possible. The area thus outlined was incised deep to adipose tissue with a #15 scalpel blade.  The skin margins were undermined to an appropriate distance in all directions around the primary defect and laterally outward around the flap utilizing iris scissors.

## 2019-06-18 ENCOUNTER — EMERGENCY (EMERGENCY)
Facility: HOSPITAL | Age: 59
LOS: 1 days | Discharge: HOME | End: 2019-06-18
Attending: EMERGENCY MEDICINE
Payer: MEDICAID

## 2019-06-18 VITALS
OXYGEN SATURATION: 98 % | WEIGHT: 136.03 LBS | SYSTOLIC BLOOD PRESSURE: 132 MMHG | RESPIRATION RATE: 20 BRPM | DIASTOLIC BLOOD PRESSURE: 74 MMHG | HEIGHT: 58 IN | TEMPERATURE: 99 F | HEART RATE: 76 BPM

## 2019-06-18 DIAGNOSIS — Z92.3 PERSONAL HISTORY OF IRRADIATION: Chronic | ICD-10-CM

## 2019-06-18 LAB
ALBUMIN SERPL ELPH-MCNC: 4 G/DL — SIGNIFICANT CHANGE UP (ref 3.3–5.2)
ALP SERPL-CCNC: 96 U/L — SIGNIFICANT CHANGE UP (ref 40–120)
ALT FLD-CCNC: 12 U/L — SIGNIFICANT CHANGE UP
ANION GAP SERPL CALC-SCNC: 12 MMOL/L — SIGNIFICANT CHANGE UP (ref 5–17)
APPEARANCE UR: CLEAR — SIGNIFICANT CHANGE UP
AST SERPL-CCNC: 19 U/L — SIGNIFICANT CHANGE UP
BACTERIA # UR AUTO: ABNORMAL
BASOPHILS # BLD AUTO: 0 K/UL — SIGNIFICANT CHANGE UP (ref 0–0.2)
BASOPHILS NFR BLD AUTO: 0.3 % — SIGNIFICANT CHANGE UP (ref 0–2)
BILIRUB SERPL-MCNC: <0.2 MG/DL — LOW (ref 0.4–2)
BILIRUB UR-MCNC: NEGATIVE — SIGNIFICANT CHANGE UP
BUN SERPL-MCNC: 10 MG/DL — SIGNIFICANT CHANGE UP (ref 8–20)
CALCIUM SERPL-MCNC: 8.8 MG/DL — SIGNIFICANT CHANGE UP (ref 8.6–10.2)
CHLORIDE SERPL-SCNC: 105 MMOL/L — SIGNIFICANT CHANGE UP (ref 98–107)
CO2 SERPL-SCNC: 26 MMOL/L — SIGNIFICANT CHANGE UP (ref 22–29)
COLOR SPEC: YELLOW — SIGNIFICANT CHANGE UP
CREAT SERPL-MCNC: 0.54 MG/DL — SIGNIFICANT CHANGE UP (ref 0.5–1.3)
DIFF PNL FLD: ABNORMAL
EOSINOPHIL # BLD AUTO: 0.2 K/UL — SIGNIFICANT CHANGE UP (ref 0–0.5)
EOSINOPHIL NFR BLD AUTO: 2.1 % — SIGNIFICANT CHANGE UP (ref 0–6)
EPI CELLS # UR: SIGNIFICANT CHANGE UP
GLUCOSE SERPL-MCNC: 104 MG/DL — SIGNIFICANT CHANGE UP (ref 70–115)
GLUCOSE UR QL: NEGATIVE MG/DL — SIGNIFICANT CHANGE UP
HCT VFR BLD CALC: 39.9 % — SIGNIFICANT CHANGE UP (ref 37–47)
HGB BLD-MCNC: 13.1 G/DL — SIGNIFICANT CHANGE UP (ref 12–16)
KETONES UR-MCNC: NEGATIVE — SIGNIFICANT CHANGE UP
LEUKOCYTE ESTERASE UR-ACNC: ABNORMAL
LYMPHOCYTES # BLD AUTO: 2.8 K/UL — SIGNIFICANT CHANGE UP (ref 1–4.8)
LYMPHOCYTES # BLD AUTO: 25.7 % — SIGNIFICANT CHANGE UP (ref 20–55)
MCHC RBC-ENTMCNC: 31.6 PG — HIGH (ref 27–31)
MCHC RBC-ENTMCNC: 32.8 G/DL — SIGNIFICANT CHANGE UP (ref 32–36)
MCV RBC AUTO: 96.1 FL — SIGNIFICANT CHANGE UP (ref 81–99)
MONOCYTES # BLD AUTO: 0.7 K/UL — SIGNIFICANT CHANGE UP (ref 0–0.8)
MONOCYTES NFR BLD AUTO: 6.7 % — SIGNIFICANT CHANGE UP (ref 3–10)
NEUTROPHILS # BLD AUTO: 7 K/UL — SIGNIFICANT CHANGE UP (ref 1.8–8)
NEUTROPHILS NFR BLD AUTO: 64.8 % — SIGNIFICANT CHANGE UP (ref 37–73)
NITRITE UR-MCNC: NEGATIVE — SIGNIFICANT CHANGE UP
PH UR: 6.5 — SIGNIFICANT CHANGE UP (ref 5–8)
PLATELET # BLD AUTO: 252 K/UL — SIGNIFICANT CHANGE UP (ref 150–400)
POTASSIUM SERPL-MCNC: 3.9 MMOL/L — SIGNIFICANT CHANGE UP (ref 3.5–5.3)
POTASSIUM SERPL-SCNC: 3.9 MMOL/L — SIGNIFICANT CHANGE UP (ref 3.5–5.3)
PROT SERPL-MCNC: 6.8 G/DL — SIGNIFICANT CHANGE UP (ref 6.6–8.7)
PROT UR-MCNC: 15 MG/DL
RBC # BLD: 4.15 M/UL — LOW (ref 4.4–5.2)
RBC # FLD: 12.7 % — SIGNIFICANT CHANGE UP (ref 11–15.6)
RBC CASTS # UR COMP ASSIST: ABNORMAL /HPF (ref 0–4)
SODIUM SERPL-SCNC: 143 MMOL/L — SIGNIFICANT CHANGE UP (ref 135–145)
SP GR SPEC: 1.01 — SIGNIFICANT CHANGE UP (ref 1.01–1.02)
UROBILINOGEN FLD QL: NEGATIVE MG/DL — SIGNIFICANT CHANGE UP
WBC # BLD: 10.8 K/UL — SIGNIFICANT CHANGE UP (ref 4.8–10.8)
WBC # FLD AUTO: 10.8 K/UL — SIGNIFICANT CHANGE UP (ref 4.8–10.8)
WBC UR QL: SIGNIFICANT CHANGE UP

## 2019-06-18 PROCEDURE — 74176 CT ABD & PELVIS W/O CONTRAST: CPT

## 2019-06-18 PROCEDURE — 99284 EMERGENCY DEPT VISIT MOD MDM: CPT

## 2019-06-18 PROCEDURE — 85027 COMPLETE CBC AUTOMATED: CPT

## 2019-06-18 PROCEDURE — 80053 COMPREHEN METABOLIC PANEL: CPT

## 2019-06-18 PROCEDURE — T1013: CPT

## 2019-06-18 PROCEDURE — 36415 COLL VENOUS BLD VENIPUNCTURE: CPT

## 2019-06-18 PROCEDURE — 81001 URINALYSIS AUTO W/SCOPE: CPT

## 2019-06-18 PROCEDURE — 74176 CT ABD & PELVIS W/O CONTRAST: CPT | Mod: 26

## 2019-06-18 RX ORDER — NITROFURANTOIN MACROCRYSTAL 50 MG
1 CAPSULE ORAL
Qty: 20 | Refills: 0
Start: 2019-06-18 | End: 2019-06-27

## 2019-06-18 RX ORDER — CEFPODOXIME PROXETIL 100 MG
100 TABLET ORAL EVERY 12 HOURS
Refills: 0 | Status: DISCONTINUED | OUTPATIENT
Start: 2019-06-18 | End: 2019-06-23

## 2019-06-18 RX ORDER — CEFPODOXIME PROXETIL 100 MG
1 TABLET ORAL
Qty: 20 | Refills: 0
Start: 2019-06-18 | End: 2019-06-27

## 2019-06-18 RX ORDER — ACETAMINOPHEN 500 MG
975 TABLET ORAL ONCE
Refills: 0 | Status: COMPLETED | OUTPATIENT
Start: 2019-06-18 | End: 2019-06-18

## 2019-06-18 RX ADMIN — Medication 975 MILLIGRAM(S): at 21:35

## 2019-06-18 RX ADMIN — Medication 100 MILLIGRAM(S): at 23:54

## 2019-06-18 NOTE — ED STATDOCS - CLINICAL SUMMARY MEDICAL DECISION MAKING FREE TEXT BOX
Pt with flank pain and hematuria. will evaluate with CT renal stone hunt and urinalysis Pt with flank pain and hematuria, no sign of stone, well appearing, afebrile, abd soft, stable for dc

## 2019-06-18 NOTE — ED STATDOCS - OBJECTIVE STATEMENT
57 y/o F pt with PMHx of 1x kidney stone, enlarged thyroid, Hypercholesterolemia, Hypothyroidism presents to the ED c/o hematuria since last night. Pt reports she began experiencing dysuria along with increased urine frequency. Pt also has associated sxs of abd pain. Pt notes that she has 1 kidney since birth. Denies n/v/d, CP, SOB. 59 y/o F pt with PMHx of 1x kidney stone, enlarged thyroid, Hypercholesterolemia, Hypothyroidism presents to the ED c/o hematuria since last night. Pt reports she began experiencing dysuria along with increased urine frequency. Pt also has associated sxs of abd pain. Pt notes that she has 1 kidney since birth. Denies n/v/d, CP, SOB, fever chills. 57 y/o F pt with PMHx of 1x kidney stone, enlarged thyroid, Hypercholesterolemia, Hypothyroidism presents to the ED c/o hematuria since last night. Pt reports she began experiencing dysuria along with increased urine frequency. Pt also has associated sxs of abd pain. Pt notes that she has 1 kidney since birth. Denies n/v/d, CP, SOB, fever chills.   Patient's history was obtained with the help of an ED .

## 2019-11-07 ENCOUNTER — APPOINTMENT (OUTPATIENT)
Dept: VASCULAR SURGERY | Facility: CLINIC | Age: 59
End: 2019-11-07
Payer: MEDICAID

## 2019-11-07 DIAGNOSIS — I83.90 ASYMPTOMATIC VARICOSE VEINS OF UNSPECIFIED LOWER EXTREMITY: ICD-10-CM

## 2019-11-07 PROCEDURE — 99203 OFFICE O/P NEW LOW 30 MIN: CPT

## 2019-11-07 PROCEDURE — 93970 EXTREMITY STUDY: CPT

## 2019-11-07 NOTE — PHYSICAL EXAM
[JVD] : jugular venous distention ~L [Normal Breath Sounds] : Normal breath sounds [Normal Rate and Rhythm] : normal rate and rhythm [2+] : left 2+ [Varicose Veins Of Lower Extremities] : present [Varicose Veins Of The Left Leg] : of the left leg [Ankle Swelling On The Left] : moderate [No Rash or Lesion] : No rash or lesion [Alert] : alert [Oriented to Person] : oriented to person [Oriented to Place] : oriented to place [Oriented to Time] : oriented to time [] : not present [Ankle Swelling (On Exam)] : not present [Abdomen Masses] : No abdominal masses [de-identified] : Well appearing [Abdomen Tenderness] : ~T ~M No abdominal tenderness

## 2019-11-07 NOTE — HISTORY OF PRESENT ILLNESS
[FreeTextEntry1] : 59 year old female presents for evaluation of bilateral lower extremity varicose veins. Symptoms have worsened over the last year. She complains of dull aching with standing or sitting, heaviness in her legs and itchiness of the veins around her ankle. She also has ankle swelling that worsens through the day. Symptoms are worse in her left leg.\par She regularly wears compression stockings without improvement in symptoms.\par No history of DVT.\par She denies claudication, rest pain or leg ulcers\par \par \par

## 2019-11-07 NOTE — ASSESSMENT
[FreeTextEntry1] : 59 year old female with bilateral lower extremity symptomatic varicose veins\par Venous duplex reveals pathologic reflux in bilateral GSV > 2 seconds.\par She has failed conservative management strategies including compression stockings and exercise.\par I have discussed the option of GSV RF ablation. We have discussed the risks and benefits of the planned procedure and she will like to proceed.\par Will schedule for left GSV ablation

## 2019-11-16 ENCOUNTER — APPOINTMENT (OUTPATIENT)
Dept: MAMMOGRAPHY | Facility: CLINIC | Age: 59
End: 2019-11-16
Payer: COMMERCIAL

## 2019-11-16 ENCOUNTER — OUTPATIENT (OUTPATIENT)
Dept: OUTPATIENT SERVICES | Facility: HOSPITAL | Age: 59
LOS: 1 days | End: 2019-11-16
Payer: COMMERCIAL

## 2019-11-16 DIAGNOSIS — Z92.3 PERSONAL HISTORY OF IRRADIATION: Chronic | ICD-10-CM

## 2019-11-16 DIAGNOSIS — Z12.39 ENCOUNTER FOR OTHER SCREENING FOR MALIGNANT NEOPLASM OF BREAST: ICD-10-CM

## 2019-11-16 PROCEDURE — 77067 SCR MAMMO BI INCL CAD: CPT

## 2019-11-16 PROCEDURE — 77067 SCR MAMMO BI INCL CAD: CPT | Mod: 26

## 2019-11-16 PROCEDURE — 77063 BREAST TOMOSYNTHESIS BI: CPT

## 2019-11-16 PROCEDURE — 77063 BREAST TOMOSYNTHESIS BI: CPT | Mod: 26

## 2020-01-27 ENCOUNTER — EMERGENCY (EMERGENCY)
Facility: HOSPITAL | Age: 60
LOS: 1 days | Discharge: HOME | End: 2020-01-27
Attending: EMERGENCY MEDICINE
Payer: MEDICAID

## 2020-01-27 VITALS
DIASTOLIC BLOOD PRESSURE: 78 MMHG | RESPIRATION RATE: 18 BRPM | SYSTOLIC BLOOD PRESSURE: 169 MMHG | WEIGHT: 139.99 LBS | OXYGEN SATURATION: 96 % | HEART RATE: 60 BPM | TEMPERATURE: 98 F | HEIGHT: 55 IN

## 2020-01-27 DIAGNOSIS — Z92.3 PERSONAL HISTORY OF IRRADIATION: Chronic | ICD-10-CM

## 2020-01-27 DIAGNOSIS — Z98.891 HISTORY OF UTERINE SCAR FROM PREVIOUS SURGERY: Chronic | ICD-10-CM

## 2020-01-27 LAB
ALBUMIN SERPL ELPH-MCNC: 4.3 G/DL — SIGNIFICANT CHANGE UP (ref 3.3–5.2)
ALP SERPL-CCNC: 78 U/L — SIGNIFICANT CHANGE UP (ref 40–120)
ALT FLD-CCNC: 18 U/L — SIGNIFICANT CHANGE UP
ANION GAP SERPL CALC-SCNC: 13 MMOL/L — SIGNIFICANT CHANGE UP (ref 5–17)
AST SERPL-CCNC: 23 U/L — SIGNIFICANT CHANGE UP
BASOPHILS # BLD AUTO: 0.03 K/UL — SIGNIFICANT CHANGE UP (ref 0–0.2)
BASOPHILS NFR BLD AUTO: 0.5 % — SIGNIFICANT CHANGE UP (ref 0–2)
BILIRUB SERPL-MCNC: 0.2 MG/DL — LOW (ref 0.4–2)
BUN SERPL-MCNC: 9 MG/DL — SIGNIFICANT CHANGE UP (ref 8–20)
CALCIUM SERPL-MCNC: 8.9 MG/DL — SIGNIFICANT CHANGE UP (ref 8.6–10.2)
CHLORIDE SERPL-SCNC: 104 MMOL/L — SIGNIFICANT CHANGE UP (ref 98–107)
CO2 SERPL-SCNC: 27 MMOL/L — SIGNIFICANT CHANGE UP (ref 22–29)
CREAT SERPL-MCNC: 0.5 MG/DL — SIGNIFICANT CHANGE UP (ref 0.5–1.3)
EOSINOPHIL # BLD AUTO: 0.09 K/UL — SIGNIFICANT CHANGE UP (ref 0–0.5)
EOSINOPHIL NFR BLD AUTO: 1.5 % — SIGNIFICANT CHANGE UP (ref 0–6)
GLUCOSE SERPL-MCNC: 92 MG/DL — SIGNIFICANT CHANGE UP (ref 70–99)
HCT VFR BLD CALC: 45.4 % — HIGH (ref 34.5–45)
HGB BLD-MCNC: 14.2 G/DL — SIGNIFICANT CHANGE UP (ref 11.5–15.5)
IMM GRANULOCYTES NFR BLD AUTO: 0.9 % — SIGNIFICANT CHANGE UP (ref 0–1.5)
LIDOCAIN IGE QN: 20 U/L — LOW (ref 22–51)
LYMPHOCYTES # BLD AUTO: 1.69 K/UL — SIGNIFICANT CHANGE UP (ref 1–3.3)
LYMPHOCYTES # BLD AUTO: 28.9 % — SIGNIFICANT CHANGE UP (ref 13–44)
MCHC RBC-ENTMCNC: 30.7 PG — SIGNIFICANT CHANGE UP (ref 27–34)
MCHC RBC-ENTMCNC: 31.3 GM/DL — LOW (ref 32–36)
MCV RBC AUTO: 98.3 FL — SIGNIFICANT CHANGE UP (ref 80–100)
MONOCYTES # BLD AUTO: 0.69 K/UL — SIGNIFICANT CHANGE UP (ref 0–0.9)
MONOCYTES NFR BLD AUTO: 11.8 % — SIGNIFICANT CHANGE UP (ref 2–14)
NEUTROPHILS # BLD AUTO: 3.3 K/UL — SIGNIFICANT CHANGE UP (ref 1.8–7.4)
NEUTROPHILS NFR BLD AUTO: 56.4 % — SIGNIFICANT CHANGE UP (ref 43–77)
PLATELET # BLD AUTO: 256 K/UL — SIGNIFICANT CHANGE UP (ref 150–400)
POTASSIUM SERPL-MCNC: 3.4 MMOL/L — LOW (ref 3.5–5.3)
POTASSIUM SERPL-SCNC: 3.4 MMOL/L — LOW (ref 3.5–5.3)
PROT SERPL-MCNC: 7.2 G/DL — SIGNIFICANT CHANGE UP (ref 6.6–8.7)
RBC # BLD: 4.62 M/UL — SIGNIFICANT CHANGE UP (ref 3.8–5.2)
RBC # FLD: 12.3 % — SIGNIFICANT CHANGE UP (ref 10.3–14.5)
SODIUM SERPL-SCNC: 144 MMOL/L — SIGNIFICANT CHANGE UP (ref 135–145)
WBC # BLD: 5.85 K/UL — SIGNIFICANT CHANGE UP (ref 3.8–10.5)
WBC # FLD AUTO: 5.85 K/UL — SIGNIFICANT CHANGE UP (ref 3.8–10.5)

## 2020-01-27 PROCEDURE — 83690 ASSAY OF LIPASE: CPT

## 2020-01-27 PROCEDURE — T1013: CPT

## 2020-01-27 PROCEDURE — 96374 THER/PROPH/DIAG INJ IV PUSH: CPT | Mod: XU

## 2020-01-27 PROCEDURE — 74177 CT ABD & PELVIS W/CONTRAST: CPT | Mod: 26

## 2020-01-27 PROCEDURE — 74177 CT ABD & PELVIS W/CONTRAST: CPT

## 2020-01-27 PROCEDURE — 80053 COMPREHEN METABOLIC PANEL: CPT

## 2020-01-27 PROCEDURE — 85027 COMPLETE CBC AUTOMATED: CPT

## 2020-01-27 PROCEDURE — 99284 EMERGENCY DEPT VISIT MOD MDM: CPT

## 2020-01-27 PROCEDURE — 96361 HYDRATE IV INFUSION ADD-ON: CPT

## 2020-01-27 PROCEDURE — 70450 CT HEAD/BRAIN W/O DYE: CPT | Mod: 26

## 2020-01-27 PROCEDURE — 36415 COLL VENOUS BLD VENIPUNCTURE: CPT

## 2020-01-27 PROCEDURE — 99284 EMERGENCY DEPT VISIT MOD MDM: CPT | Mod: 25

## 2020-01-27 PROCEDURE — 70450 CT HEAD/BRAIN W/O DYE: CPT

## 2020-01-27 RX ORDER — KETOROLAC TROMETHAMINE 30 MG/ML
15 SYRINGE (ML) INJECTION ONCE
Refills: 0 | Status: COMPLETED | OUTPATIENT
Start: 2020-01-27 | End: 2020-01-27

## 2020-01-27 RX ORDER — SODIUM CHLORIDE 9 MG/ML
1000 INJECTION, SOLUTION INTRAVENOUS ONCE
Refills: 0 | Status: COMPLETED | OUTPATIENT
Start: 2020-01-27 | End: 2020-01-27

## 2020-01-27 RX ORDER — ONDANSETRON 8 MG/1
1 TABLET, FILM COATED ORAL
Qty: 9 | Refills: 0
Start: 2020-01-27 | End: 2020-01-29

## 2020-01-27 RX ORDER — ONDANSETRON 8 MG/1
4 TABLET, FILM COATED ORAL ONCE
Refills: 0 | Status: COMPLETED | OUTPATIENT
Start: 2020-01-27 | End: 2020-01-27

## 2020-01-27 RX ADMIN — ONDANSETRON 4 MILLIGRAM(S): 8 TABLET, FILM COATED ORAL at 16:21

## 2020-01-27 RX ADMIN — SODIUM CHLORIDE 1000 MILLILITER(S): 9 INJECTION, SOLUTION INTRAVENOUS at 17:20

## 2020-01-27 RX ADMIN — SODIUM CHLORIDE 3000 MILLILITER(S): 9 INJECTION, SOLUTION INTRAVENOUS at 16:22

## 2020-01-27 NOTE — ED STATDOCS - OBJECTIVE STATEMENT
59 year old F pt with past medical history significant for enlarged thyroid, hypercholesterolemia, hypothyroidism presents to the ED c/o

## 2020-01-27 NOTE — ED ADULT NURSE NOTE - NSIMPLEMENTINTERV_GEN_ALL_ED
Implemented All Universal Safety Interventions:  [place] to call system. Call bell, personal items and telephone within reach. Instruct patient to call for assistance. Room bathroom lighting operational. Non-slip footwear when patient is off stretcher. Physically safe environment: no spills, clutter or unnecessary equipment. Stretcher in lowest position, wheels locked, appropriate side rails in place.

## 2020-01-27 NOTE — ED PROVIDER NOTE - PATIENT PORTAL LINK FT
You can access the FollowMyHealth Patient Portal offered by [facility] by registering at the following website: [email or web address]. By joining [facility]’s FollowMyHealth portal, you will also be able to view your health information using other applications (apps) compatible with our system.

## 2020-01-27 NOTE — ED PROVIDER NOTE - PROGRESS NOTE DETAILS
PT FEELING MUCH BETTER. NOW TOLERATING PO. ADVISED TO F/U WITH HER PCP ; given f/u and return precautions.

## 2020-01-27 NOTE — ED PROVIDER NOTE - NSFOLLOWUPINSTRUCTIONS_ED_ALL_ED_FT
POR FAVOR SHANKAR JUAN TEZ CON CASTORENA MEDICO PRIMARIO; PUEDE TOMARSE 1 PASTILLA DE ZOFRAN (ONDANSETRON) CADA 8 HORAS.  SI SE SIENTE PEOR, O SI DESAROLLA NUEVOS SINTOMAS; SI NO PUEDE LEILANI LIQUIDOS , REGRESE A LA ALEJANDRINA DE EMERGENCIAS EN SEGUIDA.

## 2020-01-27 NOTE — ED PROVIDER NOTE - PMH
Enlarged thyroid    Hypercholesterolemia    Hypothyroidism Enlarged thyroid    Hypercholesterolemia    Hypothyroidism    Vertigo

## 2020-01-27 NOTE — ED PROVIDER NOTE - CLINICAL SUMMARY MEDICAL DECISION MAKING FREE TEXT BOX
pT Presents with nauesa/ vomiting/diarrhea/ LUQ pain; denying any dizziness at this time, ambulatory in ED. Plan to check labs, treat symptomatically, consider CT / reevaluate.

## 2020-01-27 NOTE — ED ADULT NURSE NOTE - OBJECTIVE STATEMENT
Pt is here with c/o epigastric pain that started this morning with N/V.  P/s the pain radiates to her back.  Pt denies fever, denies diarrhea.

## 2020-01-27 NOTE — ED STATDOCS - PROGRESS NOTE DETAILS
59 year old F pt with past medical history significant for brain aneurysm, vertigo, enlarged thyroid, osteoporosis, hypercholesterolemia, hypothyroidism presents to the ED c/o headache, neck pain, dizziness which began this morning. Pt also complaining of abdominal pain, nausea, vomiting, which began 6 days ago.   Pt reports that she began to experience abdominal pain and diarrhea 6 days ago, but her pain and diarrhea persisted throughout the week with slight improvement. 3 days ago, pt began to experience nausea and vomiting in addition to her abdominal pain and diarrhea. Her diarrhea resolved 2 days ago, after pt took Pepto-Bismol. However, she has not had a bowel movement since, and has continued to vomit, reporting over 10 episodes of non-bloody, non-bilious emesis in the past 3 days, last at 1000 today. Today she also began to experience neck pain, with a headache and dizziness, described as "feeling off-balance," which prompted her to come to the ED. Dizziness has resolved at time of ED evaluation, but pt with complaints of headache and neck pain at this time as well. Denies fever/cp/sob/palpitations/cough/rash/dysuria/hematuria/bloody.stool. No further complaints at this time.   : Pennie Okay to scan with IV contrast without labs given history of brain aneurysm, as benefits outweigh the risks at this time.

## 2020-01-27 NOTE — ED STATDOCS - NS_EDPROVIDERDISPOUSERTYPE_ED_A_ED
Scribe Attestation (For Scribes USE Only)... Attending Attestation (For Attendings USE Only).../Scribe Attestation (For Scribes USE Only)... see attending attestation

## 2020-01-27 NOTE — ED PROVIDER NOTE - OBJECTIVE STATEMENT
59yoF with h/o vertigo, presenting with nausea/ vomiting/diarrhea.  Pt states she started having diarrhea on Tuesday which improved 59yoF with h/o vertigo, presenting with nausea/ vomiting/diarrhea.  Pt states she started having diarrhea on Tuesday which improved after taking pepto bismol on Sunday; On Saturday started to have vomiting; vomited Saturday, Sunday, Monday; vomited x 1 today at 10 AM;  c/o some pain in the LUQ radiating towards the back; 8/10;  c/o chills but no fever;  States that it's normal for her to have some vomiting related to her vertigo;  states on Saturday she felt slightly off balance. 59yoF with h/o vertigo, presenting with nausea/ vomiting/diarrhea.  Pt states she started having diarrhea on Tuesday which improved after taking pepto bismol on Sunday; On Saturday started to have vomiting; vomited Saturday, Sunday, Monday; vomited x 1 today at 10 AM;  c/o some pain in the LUQ radiating towards the back; 8/10;  c/o chills but no fever;  States that it's normal for her to have some vomiting related to her vertigo;  states on Saturday she felt slightly off balance. Denies any headache/ dizziness/ chest pain.  States she normally does not take medicine for the vertigo. Denies sick contacts or travel. Denies any urinary symptoms. 59yoF with h/o vertigo, presenting with nausea/ vomiting/diarrhea.  Pt states she started having diarrhea on Tuesday which improved after taking Pepto bismol on Sunday; On Saturday started to have vomiting; vomited Saturday, Sunday, Monday; vomited x 1 today at 10 AM;  c/o some pain in the LUQ radiating towards the back; 8/10;  c/o chills but no fever;  States that it's normal for her to have some vomiting related to her vertigo;  states on Saturday she felt slightly off balance. Denies any headache/ dizziness/ chest pain.  States she normally does not take medicine for the vertigo. Denies sick contacts or travel. Denies any urinary symptoms.

## 2020-01-30 ENCOUNTER — EMERGENCY (EMERGENCY)
Facility: HOSPITAL | Age: 60
LOS: 1 days | Discharge: HOME | End: 2020-01-30
Attending: STUDENT IN AN ORGANIZED HEALTH CARE EDUCATION/TRAINING PROGRAM
Payer: MEDICAID

## 2020-01-30 VITALS
HEART RATE: 67 BPM | OXYGEN SATURATION: 98 % | SYSTOLIC BLOOD PRESSURE: 134 MMHG | DIASTOLIC BLOOD PRESSURE: 77 MMHG | WEIGHT: 139.99 LBS | TEMPERATURE: 98 F | RESPIRATION RATE: 16 BRPM | HEIGHT: 55 IN

## 2020-01-30 DIAGNOSIS — Z98.891 HISTORY OF UTERINE SCAR FROM PREVIOUS SURGERY: Chronic | ICD-10-CM

## 2020-01-30 DIAGNOSIS — Z92.3 PERSONAL HISTORY OF IRRADIATION: Chronic | ICD-10-CM

## 2020-01-30 PROBLEM — R42 DIZZINESS AND GIDDINESS: Chronic | Status: ACTIVE | Noted: 2020-01-27

## 2020-01-30 PROCEDURE — 99284 EMERGENCY DEPT VISIT MOD MDM: CPT

## 2020-01-30 PROCEDURE — T1013: CPT

## 2020-01-30 RX ORDER — FAMOTIDINE 10 MG/ML
1 INJECTION INTRAVENOUS
Qty: 7 | Refills: 0
Start: 2020-01-30 | End: 2020-02-05

## 2020-01-30 RX ORDER — FAMOTIDINE 10 MG/ML
40 INJECTION INTRAVENOUS ONCE
Refills: 0 | Status: COMPLETED | OUTPATIENT
Start: 2020-01-30 | End: 2020-01-30

## 2020-01-30 RX ORDER — DIPHENHYDRAMINE HCL 50 MG
1 CAPSULE ORAL
Qty: 15 | Refills: 0
Start: 2020-01-30 | End: 2020-02-03

## 2020-01-30 RX ORDER — DIPHENHYDRAMINE HCL 50 MG
25 CAPSULE ORAL ONCE
Refills: 0 | Status: COMPLETED | OUTPATIENT
Start: 2020-01-30 | End: 2020-01-30

## 2020-01-30 RX ADMIN — FAMOTIDINE 40 MILLIGRAM(S): 10 INJECTION INTRAVENOUS at 17:01

## 2020-01-30 RX ADMIN — Medication 40 MILLIGRAM(S): at 17:00

## 2020-01-30 RX ADMIN — Medication 25 MILLIGRAM(S): at 17:01

## 2020-01-30 NOTE — ED PROVIDER NOTE - OBJECTIVE STATEMENT
pt is a 58yo female with pmhx of hypothyroid presents with rash x 2 days. pt reports itchy rash on her chest and back. pt had iv contrast on monday but denies symptoms at the time. pt reports she took flexeril tuesday which possibly exacerbated symptoms but reports she takes it regularly without issue. pt denies fever, cp, sob, trouble swallowing, throat itching.    used

## 2020-01-30 NOTE — ED ADULT TRIAGE NOTE - CHIEF COMPLAINT QUOTE
Pt states was here on Monday and had CTA with contrast and now having rash since then that started the day after. Pt states rash is pruritic and full body.

## 2020-01-30 NOTE — ED PROVIDER NOTE - NSFOLLOWUPINSTRUCTIONS_ED_ALL_ED_FT
Reacción alérgica general    LO QUE NECESITA SABER:    Charlie reacción alérgica es la respuesta de wilson cuerpo a un alérgeno. Los alérgenos incluyen medicamentos, alimentos, picaduras de insectos, la caspa de los animales, el moho, el látex, los químicos y los ácaros del polvo. El polen de los árboles, el césped y las hierbas malas también pueden causar charlie reacción alérgica. Charlie reacción alérgica puede variar de leve a severa.    INSTRUCCIONES SOBRE EL TEOFILO HOSPITALARIA:    Llame al 911 en rosa maria de presentar signos o síntomas de anafilaxia,cathy dificultad para respirar, inflamación en wilson boca o garganta, o sibilancias. También es posible que tenga comezón, sarpullido, urticaria o sienta que se va a desmayar.    Regrese a la lyn de emergencias si:    Tiene sarpullido, urticaria, hinchazón o picazón está empezando a empeorar.      Wilson garganta se siente apretada o armando labios o lengua se inflaman.      Usted tiene dificultad para tragar o hablar.      Usted tiene náuseas, diarrea o calambres abdominales que empeoran, o tiene vómitos.      Usted tiene dolor o presión en el pecho.    Comuníquese con wilson médico si:    Usted tiene preguntas o inquietudes acerca de wilson condición o cuidado.        Medicamentos:Es posible que usted necesite alguno de los siguientes:     Los medicamentospara aliviar ciertos síntomas de las alergias, cathy la comezón, los estornudos y la inflamación. Usted los puede kavitha en forma de píldora o de gotas en armando ojos o nariz. Los tratamientos tópicos pueden darse para aplicarse directamente en wilson piel para ayudar a disminuir la comezón o la inflamación.      La epinefrinapodría recetarse si tiene riesgo de anafilaxia. Se trata de charlie reacción alérgica severa que puede ser potencialmente mortal. Wilson médico le indicará si necesita llevar epinefrina con usted. Le enseñarán cuándo y cómo usarla.      [place] armando medicamentos cathy se le haya indicado.Consulte con wilson médico si usted amarilis que wilson medicamento no le está ayudando o si presenta efectos secundarios. Infórmele si es alérgico a algún medicamento. Mantenga charlie lista actualizada de los medicamentos, las vitaminas y los productos herbales que hailey. Incluya los siguientes datos de los medicamentos: cantidad, frecuencia y motivo de administración. Traiga con usted la lista o los envases de las píldoras a armando citas de seguimiento. Lleve la lista de los medicamentos con usted en rosa maria de charlie emergencia.    Acuda a armando consultas de control con wilson médico según le indicaron.Anote armando preguntas para que se acuerde de hacerlas china armando visitas.    El manejo de armando síntomas:    Evite los alérgenos.Es posible que necesite hacerse pruebas de alergias con wilson médico o con un especialista para encontrar los alérgenos.      Use compresas frías en la piel o los ojos.[place] ayudará a calmar la piel o los ojos afectados por la reacción alérgica. Puede hacer charlie compresa fría empapando un paño en agua fría. Escurra el exceso de agua antes de aplicar el paño.      Enjuague armando fosas nasales con charlie solución salina.El enjuague diario puede ayudar a mantener wilson nariz mejia de alérgenos. Use agua destilada, si es posible. Puede también hervir agua del grifo y luego dejar que se enfríe antes de usarla. No use agua del grifo sin hervirla erwin.      No fume.La nicotina y otros químicos en los cigarrillos y cigarros pueden empeorar la reacción alérgica y también pueden causar daño a los pulmones. Pida información a wilson médico si usted actualmente fuma y necesita ayuda para dejar de fumar. Los cigarrillos electrónicos o el tabaco sin humo igualmente contienen nicotina. Consulte con wilson médico antes de utilizar estos productos.         © Copyright [employer] 2020       back to top                      © Copyright [employer] 2020

## 2020-01-30 NOTE — ED PROVIDER NOTE - ATTENDING CONTRIBUTION TO CARE
I performed a face to face history and physical exam of the patient and discussed their management with the resident/ACP. I reviewed the resident/ACP's note and agree with the documented findings and plan of care.    Pt with rash that started two days ago.  1 d after receiving IV contrast and on the same day as starting a muscle relaxant that she had taken before. no oral swelling. no sob.    physical - urticarial rash to chest and arms. rrr. ctab. abd - soft, nt.       plan - benadryl, steroids, reassurance.

## 2020-01-30 NOTE — ED ADULT TRIAGE NOTE - AS O2 DELIVERY
DR WEBB SPOKE WITH PT AND INFORMED  ALTHOUGH HE WAS HERE TODAY FOR A AFLUTTER ABLATION, HE IS IN AFIB TODAY. STATES SINCE HE HAS NEVER TRIED MED THERAPY TO CONTROL THE AFIB HE WOULD LIKE TO ADMIT HIM AT A LATER DATE AND START HIM ON TIKOSYN BEFORE ATTEMPTING AN AFIB ABLATION. PT IS ALSO TO HAVE HIS SLEEP STUDY/EVAL PRIOR.  PT AGREEABLE TO PLAN OF CARE. CANCELLED FOR TODAY.   
room air

## 2020-01-30 NOTE — ED ADULT NURSE NOTE - CHPI ED NUR SYMPTOMS NEG
no wheezing/no shortness of breath/no swelling of face, tongue/no throat itching/no vomiting/no nausea/no difficulty swallowing/no congestion/no difficulty breathing

## 2020-01-30 NOTE — ED PROVIDER NOTE - CLINICAL SUMMARY MEDICAL DECISION MAKING FREE TEXT BOX
pt with allergic reaction. pt denies new food, new medication. pt denies symptoms in the past. advised pt to be cautious when taking flexeril. will rx prednisone, benadryl and pepcid. advised pmd follow up. All questions answered and concerns addressed. pt verbalized understanding and agreement with plan and dx. pt advised on next step and when/where to follow up. pt advised on all take home and otc medications. pt advised to follow up with PMD. pt advised to return to ed for worsenng symptoms including fever, cp, sob. will dc.

## 2020-01-30 NOTE — ED PROVIDER NOTE - PATIENT PORTAL LINK FT
You can access the FollowMyHealth Patient Portal offered by [facility] by registering at the following website: [email or web address]. By joining [facility]’s FollowMyHealth portal, you will also be able to view your health information using other applications (apps) compatible with our system.

## 2020-06-22 NOTE — PROGRESS NOTE ADULT - PROBLEM SELECTOR PLAN 2
- cause of vertigo is more likely peripheral  - meclizine 25 mg q 6 hours PRN  - Romberg is negative,  neurological exam is normal, no nystagmus, will do Magna-Hallpike manuveur once patient's neck ache has resolved
- patient says vertigo has largely resolved, headache and neck ache has resolved  - CT scan of the had, MRI head, MRA head shows 2mm aneurysm but no bleed  - MRA of the neck is normal  - stroke ruled out  - opening pressure on lumbar tap was elevated (22 cmH20), neurology consult
Niacinamide Pregnancy And Lactation Text: These medications are considered safe during pregnancy.

## 2020-07-21 ENCOUNTER — APPOINTMENT (OUTPATIENT)
Dept: ULTRASOUND IMAGING | Facility: CLINIC | Age: 60
End: 2020-07-21
Payer: SELF-PAY

## 2020-07-21 ENCOUNTER — OUTPATIENT (OUTPATIENT)
Dept: OUTPATIENT SERVICES | Facility: HOSPITAL | Age: 60
LOS: 1 days | End: 2020-07-21
Payer: SELF-PAY

## 2020-07-21 DIAGNOSIS — N64.89 OTHER SPECIFIED DISORDERS OF BREAST: ICD-10-CM

## 2020-07-21 DIAGNOSIS — Z92.3 PERSONAL HISTORY OF IRRADIATION: Chronic | ICD-10-CM

## 2020-07-21 DIAGNOSIS — Z98.891 HISTORY OF UTERINE SCAR FROM PREVIOUS SURGERY: Chronic | ICD-10-CM

## 2020-07-21 PROCEDURE — 76641 ULTRASOUND BREAST COMPLETE: CPT

## 2020-07-21 PROCEDURE — 76641 ULTRASOUND BREAST COMPLETE: CPT | Mod: 26,LT

## 2020-08-07 ENCOUNTER — APPOINTMENT (OUTPATIENT)
Dept: MAMMOGRAPHY | Facility: CLINIC | Age: 60
End: 2020-08-07

## 2020-08-07 ENCOUNTER — OUTPATIENT (OUTPATIENT)
Dept: OUTPATIENT SERVICES | Facility: HOSPITAL | Age: 60
LOS: 1 days | End: 2020-08-07

## 2020-08-07 DIAGNOSIS — Z92.3 PERSONAL HISTORY OF IRRADIATION: Chronic | ICD-10-CM

## 2020-08-07 DIAGNOSIS — R92.8 OTHER ABNORMAL AND INCONCLUSIVE FINDINGS ON DIAGNOSTIC IMAGING OF BREAST: ICD-10-CM

## 2020-08-07 DIAGNOSIS — Z98.891 HISTORY OF UTERINE SCAR FROM PREVIOUS SURGERY: Chronic | ICD-10-CM

## 2020-11-21 ENCOUNTER — OUTPATIENT (OUTPATIENT)
Dept: OUTPATIENT SERVICES | Facility: HOSPITAL | Age: 60
LOS: 1 days | End: 2020-11-21
Payer: COMMERCIAL

## 2020-11-21 ENCOUNTER — APPOINTMENT (OUTPATIENT)
Dept: MAMMOGRAPHY | Facility: CLINIC | Age: 60
End: 2020-11-21
Payer: COMMERCIAL

## 2020-11-21 DIAGNOSIS — Z98.891 HISTORY OF UTERINE SCAR FROM PREVIOUS SURGERY: Chronic | ICD-10-CM

## 2020-11-21 DIAGNOSIS — Z92.3 PERSONAL HISTORY OF IRRADIATION: Chronic | ICD-10-CM

## 2020-11-21 DIAGNOSIS — Z00.8 ENCOUNTER FOR OTHER GENERAL EXAMINATION: ICD-10-CM

## 2020-11-21 DIAGNOSIS — Z12.31 ENCOUNTER FOR SCREENING MAMMOGRAM FOR MALIGNANT NEOPLASM OF BREAST: ICD-10-CM

## 2020-11-21 PROCEDURE — 77067 SCR MAMMO BI INCL CAD: CPT | Mod: 26

## 2020-11-21 PROCEDURE — 77067 SCR MAMMO BI INCL CAD: CPT

## 2020-11-21 PROCEDURE — 77063 BREAST TOMOSYNTHESIS BI: CPT

## 2020-11-21 PROCEDURE — 77063 BREAST TOMOSYNTHESIS BI: CPT | Mod: 26

## 2020-12-08 ENCOUNTER — APPOINTMENT (OUTPATIENT)
Dept: GASTROENTEROLOGY | Facility: CLINIC | Age: 60
End: 2020-12-08
Payer: SELF-PAY

## 2020-12-08 VITALS
TEMPERATURE: 97.9 F | WEIGHT: 140 LBS | RESPIRATION RATE: 15 BRPM | SYSTOLIC BLOOD PRESSURE: 150 MMHG | OXYGEN SATURATION: 97 % | HEART RATE: 60 BPM | DIASTOLIC BLOOD PRESSURE: 80 MMHG | HEIGHT: 62 IN | BODY MASS INDEX: 25.76 KG/M2

## 2020-12-08 DIAGNOSIS — Z78.9 OTHER SPECIFIED HEALTH STATUS: ICD-10-CM

## 2020-12-08 DIAGNOSIS — Z86.39 PERSONAL HISTORY OF OTHER ENDOCRINE, NUTRITIONAL AND METABOLIC DISEASE: ICD-10-CM

## 2020-12-08 DIAGNOSIS — Z86.010 PERSONAL HISTORY OF COLONIC POLYPS: ICD-10-CM

## 2020-12-08 DIAGNOSIS — K92.1 MELENA: ICD-10-CM

## 2020-12-08 DIAGNOSIS — Z00.00 ENCOUNTER FOR GENERAL ADULT MEDICAL EXAMINATION W/OUT ABNORMAL FINDINGS: ICD-10-CM

## 2020-12-08 DIAGNOSIS — K21.9 GASTRO-ESOPHAGEAL REFLUX DISEASE W/OUT ESOPHAGITIS: ICD-10-CM

## 2020-12-08 PROCEDURE — 99203 OFFICE O/P NEW LOW 30 MIN: CPT

## 2020-12-08 RX ORDER — POLYETHYLENE GLYCOL 3350 AND ELECTROLYTES WITH LEMON FLAVOR 236; 22.74; 6.74; 5.86; 2.97 G/4L; G/4L; G/4L; G/4L; G/4L
236 POWDER, FOR SOLUTION ORAL
Qty: 1 | Refills: 0 | Status: ACTIVE | COMMUNITY
Start: 2020-12-08 | End: 1900-01-01

## 2020-12-08 RX ORDER — LEVOTHYROXINE SODIUM 0.07 MG/1
75 TABLET ORAL
Refills: 0 | Status: ACTIVE | COMMUNITY

## 2020-12-08 RX ORDER — ALENDRONATE SODIUM 70 MG/1
70 TABLET ORAL
Refills: 0 | Status: ACTIVE | COMMUNITY

## 2020-12-08 NOTE — HISTORY OF PRESENT ILLNESS
[de-identified] : The patient arrived for consultation for stool occult blood positive. She has been taking aspirin. Her recent blood count is normal. She has been complaining of acid reflux. She had a remote EGD. Her last colonoscopy was more than 3 years ago. I do not have the records. She was noted to have colon polyps as per the patient was recommended to have colonoscopy in 2 years. CBC is normal. No cardiac or lung disease.

## 2020-12-08 NOTE — PHYSICAL EXAM
[General Appearance - Alert] : alert [General Appearance - In No Acute Distress] : in no acute distress [Sclera] : the sclera and conjunctiva were normal [PERRL With Normal Accommodation] : pupils were equal in size, round, and reactive to light [Extraocular Movements] : extraocular movements were intact [Outer Ear] : the ears and nose were normal in appearance [Oropharynx] : the oropharynx was normal [Neck Appearance] : the appearance of the neck was normal [Neck Cervical Mass (___cm)] : no neck mass was observed [Jugular Venous Distention Increased] : there was no jugular-venous distention [Thyroid Diffuse Enlargement] : the thyroid was not enlarged [Thyroid Nodule] : there were no palpable thyroid nodules [Auscultation Breath Sounds / Voice Sounds] : lungs were clear to auscultation bilaterally [Heart Rate And Rhythm] : heart rate was normal and rhythm regular [Heart Sounds] : normal S1 and S2 [Heart Sounds Gallop] : no gallops [Murmurs] : no murmurs [Heart Sounds Pericardial Friction Rub] : no pericardial rub [Full Pulse] : the pedal pulses are present [Edema] : there was no peripheral edema [Bowel Sounds] : normal bowel sounds [Abdomen Soft] : soft [Abdomen Tenderness] : non-tender [Abdomen Mass (___ Cm)] : no abdominal mass palpated [No CVA Tenderness] : no ~M costovertebral angle tenderness [No Spinal Tenderness] : no spinal tenderness [Abnormal Walk] : normal gait [Nail Clubbing] : no clubbing  or cyanosis of the fingernails [Musculoskeletal - Swelling] : no joint swelling seen [Motor Tone] : muscle strength and tone were normal [Skin Color & Pigmentation] : normal skin color and pigmentation [Skin Turgor] : normal skin turgor [] : no rash [No Focal Deficits] : no focal deficits [Oriented To Time, Place, And Person] : oriented to person, place, and time [Impaired Insight] : insight and judgment were intact [Affect] : the affect was normal

## 2020-12-08 NOTE — ASSESSMENT
[FreeTextEntry1] : There is a possibility of occult blood in stool, related to aspirin use. It is important to rule out any colonic pathology.  I am recommending to consider both EGD and colonoscopy. The bowel preparation was discussed at length. Risks (including bleeding, pain, perforation, incomplete examination, splenic laceration, adverse reactions to medications, aspiration and death), benefits and alternatives were discussed. Patient is agreeable for the colonoscopy. The patient is medically optimized for the procedure. We will schedule the patient for the procedure. Bowel preparation was sent to the pharmacy.\par \par Risks (including bleeding, pain, perforation, incomplete examination, adverse reactions to medications, aspiration and death), benefits and alternatives were discussed. Patient is agreeable for the EGD. The patient is medically optimized for the procedure. We will schedule the patient for the procedure.\par \par \par Kel Escamilla MD\par Gastroenterology \par \par

## 2021-03-19 NOTE — PATIENT PROFILE ADULT. - PROVIDER NOTIFICATION
continue IV insulin drip per protocol, once AG closed x2 can transition to sq insulin  replete lytes as needed   f/u infectious w/u  continue IV aggressive hydration  f./u ha1c
Declines

## 2021-10-06 NOTE — ED STATDOCS - NS_ATTENDINGSCRIBE_ED_ALL_ED
Patient stated he have diarrhea and nausea/vomit. Patient stated he have burning from stomach to rectum.  Medications reviewed and updated.  have known Latex allergy or symptoms of Latex sensitivity.  Social History     Tobacco Use   Smoking Status Never Smoker   Smokeless Tobacco Never Used     Patient would like communication of their results via:        Cell Phone:   Telephone Information:   Mobile [phone]     Okay to leave a message containing results? Yes     I personally performed the service described in the documentation recorded by the scribe in my presence, and it accurately and completely records my words and actions.

## 2021-12-18 ENCOUNTER — APPOINTMENT (OUTPATIENT)
Dept: MAMMOGRAPHY | Facility: CLINIC | Age: 61
End: 2021-12-18
Payer: COMMERCIAL

## 2021-12-18 ENCOUNTER — OUTPATIENT (OUTPATIENT)
Dept: OUTPATIENT SERVICES | Facility: HOSPITAL | Age: 61
LOS: 1 days | End: 2021-12-18
Payer: COMMERCIAL

## 2021-12-18 DIAGNOSIS — Z92.3 PERSONAL HISTORY OF IRRADIATION: Chronic | ICD-10-CM

## 2021-12-18 DIAGNOSIS — Z12.31 ENCOUNTER FOR SCREENING MAMMOGRAM FOR MALIGNANT NEOPLASM OF BREAST: ICD-10-CM

## 2021-12-18 DIAGNOSIS — Z98.891 HISTORY OF UTERINE SCAR FROM PREVIOUS SURGERY: Chronic | ICD-10-CM

## 2021-12-18 PROCEDURE — 77063 BREAST TOMOSYNTHESIS BI: CPT

## 2021-12-18 PROCEDURE — 77067 SCR MAMMO BI INCL CAD: CPT

## 2021-12-18 PROCEDURE — 77067 SCR MAMMO BI INCL CAD: CPT | Mod: 26

## 2021-12-18 PROCEDURE — 77063 BREAST TOMOSYNTHESIS BI: CPT | Mod: 26

## 2022-02-14 NOTE — ED ADULT NURSE NOTE - CARDIO WDL
12/6/2021:. IOP up a bit today.  needs baseline HVF in near future.  If IOP remains high we may need to Tx IOP chronically (consider HVF results). Normal rate, regular rhythm, normal S1, S2 heart sounds heard.

## 2022-11-14 ENCOUNTER — EMERGENCY (EMERGENCY)
Facility: HOSPITAL | Age: 62
LOS: 1 days | Discharge: HOME | End: 2022-11-14
Attending: EMERGENCY MEDICINE
Payer: MEDICAID

## 2022-11-14 VITALS
TEMPERATURE: 98 F | HEART RATE: 81 BPM | SYSTOLIC BLOOD PRESSURE: 155 MMHG | DIASTOLIC BLOOD PRESSURE: 76 MMHG | OXYGEN SATURATION: 96 % | RESPIRATION RATE: 16 BRPM

## 2022-11-14 DIAGNOSIS — Z98.891 HISTORY OF UTERINE SCAR FROM PREVIOUS SURGERY: Chronic | ICD-10-CM

## 2022-11-14 DIAGNOSIS — Z92.3 PERSONAL HISTORY OF IRRADIATION: Chronic | ICD-10-CM

## 2022-11-14 LAB
APPEARANCE UR: CLEAR — SIGNIFICANT CHANGE UP
BILIRUB UR-MCNC: NEGATIVE — SIGNIFICANT CHANGE UP
COLOR SPEC: YELLOW — SIGNIFICANT CHANGE UP
DIFF PNL FLD: ABNORMAL
GLUCOSE UR QL: NEGATIVE MG/DL — SIGNIFICANT CHANGE UP
KETONES UR-MCNC: NEGATIVE — SIGNIFICANT CHANGE UP
LEUKOCYTE ESTERASE UR-ACNC: NEGATIVE — SIGNIFICANT CHANGE UP
NITRITE UR-MCNC: NEGATIVE — SIGNIFICANT CHANGE UP
PH UR: 7 — SIGNIFICANT CHANGE UP (ref 5–8)
PROT UR-MCNC: NEGATIVE — SIGNIFICANT CHANGE UP
SP GR SPEC: 1.01 — SIGNIFICANT CHANGE UP (ref 1.01–1.02)
UROBILINOGEN FLD QL: NEGATIVE MG/DL — SIGNIFICANT CHANGE UP

## 2022-11-14 PROCEDURE — 84484 ASSAY OF TROPONIN QUANT: CPT

## 2022-11-14 PROCEDURE — 93005 ELECTROCARDIOGRAM TRACING: CPT

## 2022-11-14 PROCEDURE — 81001 URINALYSIS AUTO W/SCOPE: CPT

## 2022-11-14 PROCEDURE — 96374 THER/PROPH/DIAG INJ IV PUSH: CPT

## 2022-11-14 PROCEDURE — 96375 TX/PRO/DX INJ NEW DRUG ADDON: CPT

## 2022-11-14 PROCEDURE — 99284 EMERGENCY DEPT VISIT MOD MDM: CPT | Mod: 25

## 2022-11-14 PROCEDURE — 99285 EMERGENCY DEPT VISIT HI MDM: CPT

## 2022-11-14 PROCEDURE — 96361 HYDRATE IV INFUSION ADD-ON: CPT

## 2022-11-14 PROCEDURE — 93010 ELECTROCARDIOGRAM REPORT: CPT

## 2022-11-14 PROCEDURE — 80053 COMPREHEN METABOLIC PANEL: CPT

## 2022-11-14 PROCEDURE — 85025 COMPLETE CBC W/AUTO DIFF WBC: CPT

## 2022-11-14 PROCEDURE — 36415 COLL VENOUS BLD VENIPUNCTURE: CPT

## 2022-11-14 RX ORDER — MECLIZINE HCL 12.5 MG
50 TABLET ORAL ONCE
Refills: 0 | Status: COMPLETED | OUTPATIENT
Start: 2022-11-14 | End: 2022-11-14

## 2022-11-14 RX ORDER — ACETAMINOPHEN 500 MG
975 TABLET ORAL ONCE
Refills: 0 | Status: COMPLETED | OUTPATIENT
Start: 2022-11-14 | End: 2022-11-14

## 2022-11-14 RX ORDER — METOCLOPRAMIDE HCL 10 MG
10 TABLET ORAL ONCE
Refills: 0 | Status: COMPLETED | OUTPATIENT
Start: 2022-11-14 | End: 2022-11-14

## 2022-11-14 RX ORDER — KETOROLAC TROMETHAMINE 30 MG/ML
15 SYRINGE (ML) INJECTION ONCE
Refills: 0 | Status: DISCONTINUED | OUTPATIENT
Start: 2022-11-14 | End: 2022-11-14

## 2022-11-14 RX ORDER — SODIUM CHLORIDE 9 MG/ML
1000 INJECTION, SOLUTION INTRAVENOUS ONCE
Refills: 0 | Status: COMPLETED | OUTPATIENT
Start: 2022-11-14 | End: 2022-11-14

## 2022-11-14 RX ADMIN — Medication 50 MILLIGRAM(S): at 22:59

## 2022-11-14 RX ADMIN — Medication 10 MILLIGRAM(S): at 22:59

## 2022-11-14 RX ADMIN — Medication 15 MILLIGRAM(S): at 23:55

## 2022-11-14 RX ADMIN — Medication 975 MILLIGRAM(S): at 23:55

## 2022-11-14 RX ADMIN — SODIUM CHLORIDE 1000 MILLILITER(S): 9 INJECTION, SOLUTION INTRAVENOUS at 22:59

## 2022-11-14 NOTE — ED STATDOCS - OBJECTIVE STATEMENT
61 y/o female with PMHx of HLD, vertigo, and hyperthyroidism presents to ED c/o vomiting that started at noon today. Pt also felt a headache and tiredness. Pt states that she took her vertigo meds, but it had no effect. Since symptoms started PTs dizziness has gotten better, but she is still vomiting.      INT: Lex

## 2022-11-14 NOTE — ED STATDOCS - CARE PROVIDER_API CALL
Kenneth Sifuentes)  Neurology  [address], [address]  [place] [address]  Phone: [phone]  Fax: [phone]  Follow Up Time:     Jay Borja)  Otolaryngology  [facility], [place] [address]  Phone: [phone]  Fax: [phone]  Follow Up Time:

## 2022-11-14 NOTE — ED STATDOCS - CARE PROVIDERS DIRECT ADDRESSES
[email or web address]

## 2022-11-14 NOTE — ED STATDOCS - ATTENDING APP SHARED VISIT CONTRIBUTION OF CARE
I, Tyler Villafuerte, performed the initial face to face bedside interview with this patient regarding history of present illness, review of symptoms and relevant past medical, social and family history.  I completed an independent physical examination.  I was the initial provider who evaluated this patient. I have signed out the follow up of any pending tests (i.e. labs, radiological studies) to the ACP.  I have communicated the patient’s plan of care and disposition with the ACP.

## 2022-11-14 NOTE — ED STATDOCS - NS ED ATTENDING STATEMENT MOD
This was a shared visit with the ABRAN. I reviewed and verified the documentation and independently performed the documented:

## 2022-11-14 NOTE — ED STATDOCS - NSICDXPASTMEDICALHX_GEN_ALL_CORE_FT
PAST MEDICAL HISTORY:  Enlarged thyroid     Hypercholesterolemia     Hypothyroidism     Vertigo

## 2022-11-14 NOTE — ED STATDOCS - PROGRESS NOTE DETAILS
SUZANNE Ellis: Patient evaluated by intake physician. HPI/ROS/PE as noted above. Will follow up plan per intake physician and continue to assess patient. SUZANNE Ellis: Patient with complete resolution of sxms. Feels well. Agrees with discharge. Steadily ambulates.

## 2022-11-14 NOTE — ED ADULT TRIAGE NOTE - CHIEF COMPLAINT QUOTE
Ambulatory reporting dizziness and vomiting since 1200, took her vertigo medications (meclizine and ondansetron) without relief. Reports that the dizziness has gotten better but has still been vomiting. Denies abdominal pain, sick contacts.

## 2022-11-14 NOTE — ED STATDOCS - CLINICAL SUMMARY MEDICAL DECISION MAKING FREE TEXT BOX
Pt with normal neuro exam. Well appearing, hx of vertigo and suspected exasperation of the same.  Will check labs, medicate, and reevaluate.

## 2022-11-14 NOTE — ED ADULT NURSE NOTE - OBJECTIVE STATEMENT
Pt presents to ED c/o vomiting beginning today. PMH vertigo, pt reports she took her medication for vertigo and nausea but the vomiting persists. Pt also c/o headache. Denies dizziness, urinary symptoms, chest pain, abdominal pain, SOB. Pt medicated as per MD orders.

## 2022-11-14 NOTE — ED STATDOCS - NSFOLLOWUPINSTRUCTIONS_ED_ALL_ED_FT
- Prescription sent to pharmacy.  - Please bring all documentation from your ED visit to any related future follow up appointment.  - Please call to schedule follow up appointment with your primary care physician within 24-48 hours.  - Please seek immediate medical attention for any new/worsening, signs/symptoms, or concerns.    Feel better!    - Receta enviada a farmacia.  - Traiga toda la documentación de wilson visita al servicio de urgencias a cualquier amando de seguimiento futura relacionada.  - Llame para programar charlie amando de seguimiento con wilson médico de atención primaria dentro de las 24 a 48 horas.  - Busque atención médica inmediata ante cualquier nuevo / empeoramiento, signos / síntomas o inquietudes.    ¡Sentirse mejor!       Mareos    Dizziness      Los mareos son un problema muy frecuente. Se trata de charlie sensación de inestabilidad o desvanecimiento. Puede sentir que se va a desmayar. Los mareos pueden provocarle charlie lesión si se tropieza o se . Las personas de todas las edades pueden sufrir mareos, dayne es más frecuente en los adultos mayores. Esta afección puede tener muchas causas, entre las que se pueden mencionar los medicamentos, la deshidratación y las enfermedades.      Siga estas instrucciones en wilson casa:      Comida y bebida   A comparison of three sample cups showing dark yellow, yellow, and pale yellow urine.   •Melly suficiente líquido cathy para mantener la orina de color amarillo pálido. [place] lola la deshidratación. Trate de beber más líquidos transparentes, cathy agua.      • No melly alcohol.      •Limite el consumo de cafeína si el médico se lo indica. Verifique los ingredientes y la información nutricional para saber si un alimento o charlie bebida contienen cafeína.      •Limite el consumo de sal (sodio) si el médico se lo indica. Verifique los ingredientes y la información nutricional para saber si un alimento o charlie bebida contienen sodio.        Actividad   A sign showing that a person should not drive. •Evite los movimientos rápidos.  •Levántese de las nina con lentitud y apóyese hasta sentirse papi.      •Por la mañana, siéntese erwin a un lado de la cama. Cuando se sienta papi, póngase lentamente de pie mientras se sostiene de algo, hasta que sepa que ha logrado el equilibrio.        •Mueva las piernas con frecuencia si debe estar de pie en un lugar china mucho tiempo. Mientras esté de pie, contraiga y relaje los músculos de las piernas.      • No conduzca vehículos ni opere maquinaria si se siente mareado.      •Evite agacharse si se siente mareado. En wilson casa, coloque los objetos de modo que le resulte fácil alcanzarlos sin agacharse.      Estilo de radha     • No consuma ningún producto que contenga nicotina o tabaco. Estos productos incluyen cigarrillos, tabaco para mascar y aparatos de vapeo, cathy los cigarrillos electrónicos. Si necesita ayuda para dejar de fumar, consulte al médico.      •Trate de reducir el nivel de estrés con métodos cathy el yoga o la meditación. Hable con el médico si necesita ayuda para controlar el nivel de estrés.      Instrucciones generales     •Controle armando mareos para abelino si hay cambios.      •Use los medicamentos de venta mejia y los recetados solamente cathy se lo haya indicado el médico. Hable con el médico si amarilis que los medicamentos que está tomando son la causa de armando mareos.      •Infórmele a un amigo o a un familiar si se siente mareado. Pídale a esta persona que llame al médico si observa cambios en wilson comportamiento.      •Concurra a todas las visitas de seguimiento. [place] es importante.        Comuníquese con un médico si:    •Los mareos no desaparecen o tiene síntomas nuevos.      •Los mareos o la sensación de desvanecimiento empeoran.      •Siente náuseas.      •Se le redujo la audición.      •Tiene fiebre.      •Dolor o rigidez en el guillermo.      •Los mareos derivan en charlie lesión o charlie caída.        Solicite ayuda de inmediato si:    •Vomita o tiene diarrea y no puede comer ni beber nada.      •Tiene dificultad para hablar, caminar, tragar o usar los brazos, las jerrod o las piernas.      •Se siente constantemente débil.      •Tiene cualquier tipo de sangrado.      •No piensa con claridad o tiene dificultad para armar oraciones. Es posible que un amigo o un familiar adviertan que esto ocurre.      •Tiene dolor de pecho, dolor abdominal, sudoración o le falta el aire.      •Tiene cambios en la visión o le aparece un dolor de ayesha intenso.      Estos síntomas pueden representar un problema grave que constituye charlie emergencia. No espere a abelino si los síntomas desaparecen. Solicite atención médica de inmediato. Comuníquese con el servicio de emergencias de wilson localidad (911 en los [place]). No conduzca por armando propios medios hasta el hospital.       Resumen    •Los mareos son charlie sensación de inestabilidad o desvanecimiento. Esta afección puede tener muchas causas, entre las que se pueden mencionar los medicamentos, la deshidratación y las enfermedades.      •Las personas de todas las edades pueden sufrir mareos, dayne es más frecuente en los adultos mayores.      •Melly suficiente líquido cathy para mantener la orina de color amarillo pálido. No melly alcohol.      •Evite los movimientos rápidos si se siente mareado. Controle armando mareos para baelino si hay cambios.      Esta información no tiene cathy fin reemplazar el consejo del médico. Asegúrese de hacerle al médico cualquier pregunta que tenga.

## 2022-11-14 NOTE — ED STATDOCS - PATIENT PORTAL LINK FT
You can access the FollowMyHealth Patient Portal offered by [facility] by registering at the following website: [email or web address]. By joining [employer]’s FollowMyHealth portal, you will also be able to view your health information using other applications (apps) compatible with our system.

## 2022-11-15 LAB
ALBUMIN SERPL ELPH-MCNC: 4.4 G/DL — SIGNIFICANT CHANGE UP (ref 3.3–5.2)
ALP SERPL-CCNC: 67 U/L — SIGNIFICANT CHANGE UP (ref 40–120)
ALT FLD-CCNC: 18 U/L — SIGNIFICANT CHANGE UP
ANION GAP SERPL CALC-SCNC: 11 MMOL/L — SIGNIFICANT CHANGE UP (ref 5–17)
AST SERPL-CCNC: 21 U/L — SIGNIFICANT CHANGE UP
BASOPHILS # BLD AUTO: 0.05 K/UL — SIGNIFICANT CHANGE UP (ref 0–0.2)
BASOPHILS NFR BLD AUTO: 0.4 % — SIGNIFICANT CHANGE UP (ref 0–2)
BILIRUB SERPL-MCNC: 0.3 MG/DL — LOW (ref 0.4–2)
BUN SERPL-MCNC: 13.9 MG/DL — SIGNIFICANT CHANGE UP (ref 8–20)
CALCIUM SERPL-MCNC: 8.9 MG/DL — SIGNIFICANT CHANGE UP (ref 8.4–10.5)
CHLORIDE SERPL-SCNC: 104 MMOL/L — SIGNIFICANT CHANGE UP (ref 96–108)
CO2 SERPL-SCNC: 25 MMOL/L — SIGNIFICANT CHANGE UP (ref 22–29)
CREAT SERPL-MCNC: 0.51 MG/DL — SIGNIFICANT CHANGE UP (ref 0.5–1.3)
EGFR: 104 ML/MIN/1.73M2 — SIGNIFICANT CHANGE UP
EOSINOPHIL # BLD AUTO: 0.01 K/UL — SIGNIFICANT CHANGE UP (ref 0–0.5)
EOSINOPHIL NFR BLD AUTO: 0.1 % — SIGNIFICANT CHANGE UP (ref 0–6)
GLUCOSE SERPL-MCNC: 120 MG/DL — HIGH (ref 70–99)
HCT VFR BLD CALC: 40.9 % — SIGNIFICANT CHANGE UP (ref 34.5–45)
HGB BLD-MCNC: 13.4 G/DL — SIGNIFICANT CHANGE UP (ref 11.5–15.5)
IMM GRANULOCYTES NFR BLD AUTO: 0.6 % — SIGNIFICANT CHANGE UP (ref 0–0.9)
LYMPHOCYTES # BLD AUTO: 1.23 K/UL — SIGNIFICANT CHANGE UP (ref 1–3.3)
LYMPHOCYTES # BLD AUTO: 9.1 % — LOW (ref 13–44)
MCHC RBC-ENTMCNC: 31.5 PG — SIGNIFICANT CHANGE UP (ref 27–34)
MCHC RBC-ENTMCNC: 32.8 GM/DL — SIGNIFICANT CHANGE UP (ref 32–36)
MCV RBC AUTO: 96 FL — SIGNIFICANT CHANGE UP (ref 80–100)
MONOCYTES # BLD AUTO: 0.73 K/UL — SIGNIFICANT CHANGE UP (ref 0–0.9)
MONOCYTES NFR BLD AUTO: 5.4 % — SIGNIFICANT CHANGE UP (ref 2–14)
NEUTROPHILS # BLD AUTO: 11.35 K/UL — HIGH (ref 1.8–7.4)
NEUTROPHILS NFR BLD AUTO: 84.4 % — HIGH (ref 43–77)
PLATELET # BLD AUTO: 276 K/UL — SIGNIFICANT CHANGE UP (ref 150–400)
POTASSIUM SERPL-MCNC: 4 MMOL/L — SIGNIFICANT CHANGE UP (ref 3.5–5.3)
POTASSIUM SERPL-SCNC: 4 MMOL/L — SIGNIFICANT CHANGE UP (ref 3.5–5.3)
PROT SERPL-MCNC: 7 G/DL — SIGNIFICANT CHANGE UP (ref 6.6–8.7)
RBC # BLD: 4.26 M/UL — SIGNIFICANT CHANGE UP (ref 3.8–5.2)
RBC # FLD: 12.4 % — SIGNIFICANT CHANGE UP (ref 10.3–14.5)
RBC CASTS # UR COMP ASSIST: SIGNIFICANT CHANGE UP /HPF (ref 0–4)
SODIUM SERPL-SCNC: 140 MMOL/L — SIGNIFICANT CHANGE UP (ref 135–145)
TROPONIN T SERPL-MCNC: <0.01 NG/ML — SIGNIFICANT CHANGE UP (ref 0–0.06)
WBC # BLD: 13.45 K/UL — HIGH (ref 3.8–10.5)
WBC # FLD AUTO: 13.45 K/UL — HIGH (ref 3.8–10.5)
WBC UR QL: NEGATIVE /HPF — SIGNIFICANT CHANGE UP (ref 0–5)

## 2022-11-15 RX ORDER — MECLIZINE HCL 12.5 MG
1 TABLET ORAL
Qty: 9 | Refills: 0
Start: 2022-11-15 | End: 2022-11-17

## 2022-11-15 RX ORDER — ONDANSETRON 8 MG/1
1 TABLET, FILM COATED ORAL
Qty: 9 | Refills: 0
Start: 2022-11-15 | End: 2022-11-17

## 2023-01-28 ENCOUNTER — APPOINTMENT (OUTPATIENT)
Dept: MAMMOGRAPHY | Facility: CLINIC | Age: 63
End: 2023-01-28
Payer: COMMERCIAL

## 2023-01-28 ENCOUNTER — OUTPATIENT (OUTPATIENT)
Dept: OUTPATIENT SERVICES | Facility: HOSPITAL | Age: 63
LOS: 1 days | End: 2023-01-28
Payer: COMMERCIAL

## 2023-01-28 DIAGNOSIS — Z98.891 HISTORY OF UTERINE SCAR FROM PREVIOUS SURGERY: Chronic | ICD-10-CM

## 2023-01-28 DIAGNOSIS — Z92.3 PERSONAL HISTORY OF IRRADIATION: Chronic | ICD-10-CM

## 2023-01-28 DIAGNOSIS — Z12.31 ENCOUNTER FOR SCREENING MAMMOGRAM FOR MALIGNANT NEOPLASM OF BREAST: ICD-10-CM

## 2023-01-28 PROCEDURE — 77067 SCR MAMMO BI INCL CAD: CPT | Mod: 26

## 2023-01-28 PROCEDURE — 77063 BREAST TOMOSYNTHESIS BI: CPT

## 2023-01-28 PROCEDURE — 77063 BREAST TOMOSYNTHESIS BI: CPT | Mod: 26

## 2023-01-28 PROCEDURE — 77067 SCR MAMMO BI INCL CAD: CPT

## 2023-04-20 ENCOUNTER — APPOINTMENT (OUTPATIENT)
Dept: NEUROLOGY | Facility: CLINIC | Age: 63
End: 2023-04-20

## 2023-06-14 NOTE — ED ADULT NURSE NOTE - DRUG PRE-SCREENING (DAST -1)
per mom pt with fever 4 days, only 2 wet diapers today. per mom has red rash in mouth, taking less PO . tylenol @1pm. pt awake alert active. -PMH -allergies VUTD BCR Statement Selected

## 2023-06-28 NOTE — ED PROVIDER NOTE - TOBACCO USE
Never smoker Bi-Rhombic Flap Text: The defect edges were debeveled with a #15 scalpel blade.  Given the location of the defect and the proximity to free margins a bi-rhombic flap was deemed most appropriate.  Using a sterile surgical marker, an appropriate rhombic flap was drawn incorporating the defect. The area thus outlined was incised deep to adipose tissue with a #15 scalpel blade.  The skin margins were undermined to an appropriate distance in all directions utilizing iris scissors.

## 2023-08-04 ENCOUNTER — EMERGENCY (EMERGENCY)
Facility: HOSPITAL | Age: 63
LOS: 1 days | Discharge: HOME | End: 2023-08-04
Attending: STUDENT IN AN ORGANIZED HEALTH CARE EDUCATION/TRAINING PROGRAM
Payer: MEDICAID

## 2023-08-04 VITALS
HEART RATE: 67 BPM | TEMPERATURE: 98 F | SYSTOLIC BLOOD PRESSURE: 166 MMHG | OXYGEN SATURATION: 97 % | WEIGHT: 137.13 LBS | RESPIRATION RATE: 20 BRPM | DIASTOLIC BLOOD PRESSURE: 83 MMHG

## 2023-08-04 DIAGNOSIS — Z92.3 PERSONAL HISTORY OF IRRADIATION: Chronic | ICD-10-CM

## 2023-08-04 DIAGNOSIS — Z98.891 HISTORY OF UTERINE SCAR FROM PREVIOUS SURGERY: Chronic | ICD-10-CM

## 2023-08-04 PROCEDURE — 71120 X-RAY EXAM BREASTBONE 2/>VWS: CPT | Mod: 26

## 2023-08-04 PROCEDURE — 93010 ELECTROCARDIOGRAM REPORT: CPT

## 2023-08-04 PROCEDURE — 71045 X-RAY EXAM CHEST 1 VIEW: CPT | Mod: 26

## 2023-08-04 PROCEDURE — 99284 EMERGENCY DEPT VISIT MOD MDM: CPT

## 2023-08-04 RX ORDER — IBUPROFEN 200 MG
600 TABLET ORAL ONCE
Refills: 0 | Status: COMPLETED | OUTPATIENT
Start: 2023-08-04 | End: 2023-08-04

## 2023-08-04 RX ORDER — ACETAMINOPHEN 500 MG
650 TABLET ORAL ONCE
Refills: 0 | Status: COMPLETED | OUTPATIENT
Start: 2023-08-04 | End: 2023-08-04

## 2023-08-04 RX ADMIN — Medication 650 MILLIGRAM(S): at 22:58

## 2023-08-04 RX ADMIN — Medication 600 MILLIGRAM(S): at 22:59

## 2023-08-04 NOTE — ED PROVIDER NOTE - ATTENDING APP SHARED VISIT CONTRIBUTION OF CARE
I have personally performed a history and physical examination of the patient and discussed management with the ABRAN as well as the patient.  I reviewed the ABRAN's note and agree with the documented findings and plan of care.  I have authored and modified critical sections of the Provider Note, including but not limited to HPI, Physical Exam and MDM.    63 year old female present to ED for sternal pain. pt reports she was hanging a shower curtain at 1pm with torso outstretched and she used all of her force to close it and felt pain to sternum. pt reports using ice without relief. 3cm x 2cm erythematoud induration on the subxiphoid abdomen.  Clinical cellulitis.  Unknown contact, will provide antibiotics.  Low suspicion PNA versus ACS.  Will obtain screening EKG and CXR.  Symptomatic control as needed.

## 2023-08-04 NOTE — ED ADULT NURSE NOTE - NSFALLUNIVINTERV_ED_ALL_ED
Bed/Stretcher in lowest position, wheels locked, appropriate side rails in place/Call bell, personal items and telephone in reach/Instruct patient to call for assistance before getting out of bed/chair/stretcher/Non-slip footwear applied when patient is off stretcher/[place] to call system/Physically safe environment - no spills, clutter or unnecessary equipment/Purposeful proactive rounding/Room/bathroom lighting operational, light cord in reach

## 2023-08-04 NOTE — ED PROVIDER NOTE - OBJECTIVE STATEMENT
63 year old female present to ED for sternal pain. pt reports she was hanging a shower curtain at 1pm with torso outstretched and she used all of her force to close it and felt pain to sternum. pt reports using ice without relief. pt states she thinks she has rash to that area, unsure of when rash began. Denies chest pain, SOB, palpitations, nausea, vomiting, diarrhea, abd pain, back pain.

## 2023-08-04 NOTE — ED PROVIDER NOTE - NSFOLLOWUPINSTRUCTIONS_ED_ALL_ED_FT
Celulitis    La celulitis es charlie infección de la piel causada por bacterias. Esta condición ocurre con mayor frecuencia en los brazos y la parte inferior de las piernas, dayne puede ocurrir en cualquier parte del cuerpo. Los síntomas incluyen enrojecimiento, hinchazón, piel caliente, sensibilidad y escalofríos/fiebre. Si le recetaron un medicamento antibiótico, tómelo cathy se lo indicó wilson proveedor de atención médica. No deje de kavitha el antibiótico aunque empiece a sentirse mejor.    BUSQUE ATENCIÓN MÉDICA INMEDIATA SI TIENE ALGUNO DE LOS SIGUIENTES SÍNTOMAS: empeoramiento de la fiebre, vetas zayas que salen del área afectada, vómitos o diarrea, o mareos/aturdimiento.    Cellulitis    Cellulitis is a skin infection caused by bacteria. This condition occurs most often in the arms and lower legs but can occur anywhere over the body. Symptoms include redness, swelling, warm skin, tenderness, and chills/fever. If you were prescribed an antibiotic medicine, take it as told by your health care provider. Do not stop taking the antibiotic even if you start to feel better.    SEEK IMMEDIATE MEDICAL CARE IF YOU HAVE ANY OF THE FOLLOWING SYMPTOMS: worsening fever, red streaks coming from affected area, vomiting or diarrhea, or dizziness/lightheadedness.

## 2023-08-04 NOTE — ED PROVIDER NOTE - CLINICAL SUMMARY MEDICAL DECISION MAKING FREE TEXT BOX
63 year old female present to ED for sternal pain. pt reports she was hanging a shower curtain at 1pm with torso outstretched and she used all of her force to close it and felt pain to sternum. pt reports using ice without relief. pt states she thinks she has rash to that area, unsure of when rash began. Denies chest pain, SOB, palpitations, nausea, vomiting, diarrhea, abd pain, back pain.  Reproducible pain over xiphoid process.   Xray, EKG, pain control, re-assess 63 year old female present to ED for sternal pain. pt reports she was hanging a shower curtain at 1pm with torso outstretched and she used all of her force to close it and felt pain to sternum. pt reports using ice without relief. pt states she thinks she has rash to that area, unsure of when rash began. Denies chest pain, SOB, palpitations, nausea, vomiting, diarrhea, abd pain, back pain.  Reproducible pain over xiphoid process.   Xray, EKG, pain control, re-assess  Xray WNL. EKG without acute changes.     Pt reassessed, pt feeling better at this time, vss, pt able to walk, talk and vocalized plan of action. Discussed in depth and explained to pt in depth the next steps that need to be taken including proper follow up with PCP or specialists. All incidental findings were discussed with pt as well. Pt verbalized their concerns and all questions were answered. Pt understands dispo and wants discharge. Given good instructions when to return to ED, strict return precautions and importance of f/u.

## 2023-08-04 NOTE — ED PROVIDER NOTE - NSICDXPASTMEDICALHX_GEN_ALL_CORE_FT
PAST MEDICAL HISTORY:  Enlarged thyroid     Hypercholesterolemia     Hypothyroidism     Vertigo

## 2023-08-04 NOTE — ED ADULT NURSE NOTE - OBJECTIVE STATEMENT
Pt comes in complaining of abdominal pain since earlier today. Pt states she was hanging up a curtain and when she reached too hard she felt her upper abdomen begin to "burn." Denies N/V/D.

## 2023-08-04 NOTE — ED PROVIDER NOTE - PATIENT PORTAL LINK FT
You can access the FollowMyHealth Patient Portal offered by [facility] by registering at the following website: [email or web address]. By joining [employer]’s FollowMyHealth portal, you will also be able to view your health information using other applications (apps) compatible with our system.

## 2023-08-04 NOTE — ED PROVIDER NOTE - PHYSICAL EXAMINATION
Gen: No acute distress, non toxic  HEENT: Mucous membranes moist, pink conjunctivae, EOMI. PERRL. Airway patent.   CV: RRR, nl s1/s2.  Resp: CTAB, normal rate and effort. No wheezes, rhonchi, or crackles.   GI: Abdomen soft, NT, ND. No rebound, no guarding  Neuro: A&O x4, MAEx4. 5/5 str ext x 4. Sensation intact, symmetric throughout. No FND's. Gait intact. CN 2-12 intact.   MSK: +TTP xiphoid process No midline spinal ttp. No visualized or palpable deformities.  Skin: No rashes, skin intact and well perfused. Cap refill <2sec  Vascular: Radial and dorsalis pedal pulses 2+ b/l Gen: No acute distress, non toxic  HEENT: Mucous membranes moist, pink conjunctivae, EOMI. PERRL. Airway patent.   CV: RRR, nl s1/s2.  Resp: CTAB, normal rate and effort. No wheezes, rhonchi, or crackles.   GI: Abdomen soft, NT, ND. No rebound, no guarding  Neuro: A&O x4, MAEx4. 5/5 str ext x 4. Sensation intact, symmetric throughout. No FND's. Gait intact. CN 2-12 intact.   MSK: +TTP xiphoid process No midline spinal ttp. No visualized or palpable deformities.  Skin: 3cm x 2cm erythematoud induration on the subxiphoid abdomen  Vascular: Radial and dorsalis pedal pulses 2+ b/l

## 2023-08-05 VITALS
RESPIRATION RATE: 20 BRPM | TEMPERATURE: 98 F | HEART RATE: 60 BPM | DIASTOLIC BLOOD PRESSURE: 74 MMHG | SYSTOLIC BLOOD PRESSURE: 145 MMHG | OXYGEN SATURATION: 97 %

## 2023-08-05 PROCEDURE — 71045 X-RAY EXAM CHEST 1 VIEW: CPT

## 2023-08-05 PROCEDURE — T1013: CPT

## 2023-08-05 PROCEDURE — 93005 ELECTROCARDIOGRAM TRACING: CPT

## 2023-08-05 PROCEDURE — 99284 EMERGENCY DEPT VISIT MOD MDM: CPT | Mod: 25

## 2023-08-05 PROCEDURE — 71120 X-RAY EXAM BREASTBONE 2/>VWS: CPT

## 2023-08-05 RX ORDER — CEPHALEXIN 500 MG
500 CAPSULE ORAL EVERY 12 HOURS
Refills: 0 | Status: DISCONTINUED | OUTPATIENT
Start: 2023-08-05 | End: 2023-08-12

## 2023-08-05 RX ORDER — CEPHALEXIN 500 MG
1 CAPSULE ORAL
Qty: 14 | Refills: 0
Start: 2023-08-05 | End: 2023-08-11

## 2023-08-05 RX ADMIN — Medication 500 MILLIGRAM(S): at 00:57

## 2023-10-28 ENCOUNTER — EMERGENCY (EMERGENCY)
Facility: HOSPITAL | Age: 63
LOS: 1 days | Discharge: HOME | End: 2023-10-28
Attending: EMERGENCY MEDICINE
Payer: MEDICAID

## 2023-10-28 VITALS
TEMPERATURE: 98 F | OXYGEN SATURATION: 100 % | SYSTOLIC BLOOD PRESSURE: 101 MMHG | RESPIRATION RATE: 18 BRPM | HEART RATE: 75 BPM | WEIGHT: 142.2 LBS | DIASTOLIC BLOOD PRESSURE: 78 MMHG | HEIGHT: 58 IN

## 2023-10-28 VITALS
DIASTOLIC BLOOD PRESSURE: 68 MMHG | SYSTOLIC BLOOD PRESSURE: 111 MMHG | OXYGEN SATURATION: 100 % | HEART RATE: 72 BPM | TEMPERATURE: 98 F | RESPIRATION RATE: 18 BRPM

## 2023-10-28 DIAGNOSIS — Z92.3 PERSONAL HISTORY OF IRRADIATION: Chronic | ICD-10-CM

## 2023-10-28 DIAGNOSIS — Z98.891 HISTORY OF UTERINE SCAR FROM PREVIOUS SURGERY: Chronic | ICD-10-CM

## 2023-10-28 LAB
ALBUMIN SERPL ELPH-MCNC: 4.1 G/DL — SIGNIFICANT CHANGE UP (ref 3.3–5.2)
ALBUMIN SERPL ELPH-MCNC: 4.1 G/DL — SIGNIFICANT CHANGE UP (ref 3.3–5.2)
ALP SERPL-CCNC: 71 U/L — SIGNIFICANT CHANGE UP (ref 40–120)
ALP SERPL-CCNC: 71 U/L — SIGNIFICANT CHANGE UP (ref 40–120)
ALT FLD-CCNC: 20 U/L — SIGNIFICANT CHANGE UP
ALT FLD-CCNC: 20 U/L — SIGNIFICANT CHANGE UP
ANION GAP SERPL CALC-SCNC: 11 MMOL/L — SIGNIFICANT CHANGE UP (ref 5–17)
ANION GAP SERPL CALC-SCNC: 11 MMOL/L — SIGNIFICANT CHANGE UP (ref 5–17)
AST SERPL-CCNC: 31 U/L — SIGNIFICANT CHANGE UP
AST SERPL-CCNC: 31 U/L — SIGNIFICANT CHANGE UP
BASOPHILS # BLD AUTO: 0.04 K/UL — SIGNIFICANT CHANGE UP (ref 0–0.2)
BASOPHILS # BLD AUTO: 0.04 K/UL — SIGNIFICANT CHANGE UP (ref 0–0.2)
BASOPHILS NFR BLD AUTO: 0.9 % — SIGNIFICANT CHANGE UP (ref 0–2)
BASOPHILS NFR BLD AUTO: 0.9 % — SIGNIFICANT CHANGE UP (ref 0–2)
BILIRUB SERPL-MCNC: <0.2 MG/DL — LOW (ref 0.4–2)
BILIRUB SERPL-MCNC: <0.2 MG/DL — LOW (ref 0.4–2)
BUN SERPL-MCNC: 8.4 MG/DL — SIGNIFICANT CHANGE UP (ref 8–20)
BUN SERPL-MCNC: 8.4 MG/DL — SIGNIFICANT CHANGE UP (ref 8–20)
CALCIUM SERPL-MCNC: 8.2 MG/DL — LOW (ref 8.4–10.5)
CALCIUM SERPL-MCNC: 8.2 MG/DL — LOW (ref 8.4–10.5)
CHLORIDE SERPL-SCNC: 105 MMOL/L — SIGNIFICANT CHANGE UP (ref 96–108)
CHLORIDE SERPL-SCNC: 105 MMOL/L — SIGNIFICANT CHANGE UP (ref 96–108)
CO2 SERPL-SCNC: 27 MMOL/L — SIGNIFICANT CHANGE UP (ref 22–29)
CO2 SERPL-SCNC: 27 MMOL/L — SIGNIFICANT CHANGE UP (ref 22–29)
CREAT SERPL-MCNC: 0.52 MG/DL — SIGNIFICANT CHANGE UP (ref 0.5–1.3)
CREAT SERPL-MCNC: 0.52 MG/DL — SIGNIFICANT CHANGE UP (ref 0.5–1.3)
EGFR: 103 ML/MIN/1.73M2 — SIGNIFICANT CHANGE UP
EGFR: 103 ML/MIN/1.73M2 — SIGNIFICANT CHANGE UP
EOSINOPHIL # BLD AUTO: 0.13 K/UL — SIGNIFICANT CHANGE UP (ref 0–0.5)
EOSINOPHIL # BLD AUTO: 0.13 K/UL — SIGNIFICANT CHANGE UP (ref 0–0.5)
EOSINOPHIL NFR BLD AUTO: 2.8 % — SIGNIFICANT CHANGE UP (ref 0–6)
EOSINOPHIL NFR BLD AUTO: 2.8 % — SIGNIFICANT CHANGE UP (ref 0–6)
GLUCOSE SERPL-MCNC: 95 MG/DL — SIGNIFICANT CHANGE UP (ref 70–99)
GLUCOSE SERPL-MCNC: 95 MG/DL — SIGNIFICANT CHANGE UP (ref 70–99)
HCT VFR BLD CALC: 42.8 % — SIGNIFICANT CHANGE UP (ref 34.5–45)
HCT VFR BLD CALC: 42.8 % — SIGNIFICANT CHANGE UP (ref 34.5–45)
HGB BLD-MCNC: 14.1 G/DL — SIGNIFICANT CHANGE UP (ref 11.5–15.5)
HGB BLD-MCNC: 14.1 G/DL — SIGNIFICANT CHANGE UP (ref 11.5–15.5)
IMM GRANULOCYTES NFR BLD AUTO: 0.4 % — SIGNIFICANT CHANGE UP (ref 0–0.9)
IMM GRANULOCYTES NFR BLD AUTO: 0.4 % — SIGNIFICANT CHANGE UP (ref 0–0.9)
LACTATE BLDV-MCNC: 0.5 MMOL/L — SIGNIFICANT CHANGE UP (ref 0.5–2)
LACTATE BLDV-MCNC: 0.5 MMOL/L — SIGNIFICANT CHANGE UP (ref 0.5–2)
LYMPHOCYTES # BLD AUTO: 1.63 K/UL — SIGNIFICANT CHANGE UP (ref 1–3.3)
LYMPHOCYTES # BLD AUTO: 1.63 K/UL — SIGNIFICANT CHANGE UP (ref 1–3.3)
LYMPHOCYTES # BLD AUTO: 35.5 % — SIGNIFICANT CHANGE UP (ref 13–44)
LYMPHOCYTES # BLD AUTO: 35.5 % — SIGNIFICANT CHANGE UP (ref 13–44)
MCHC RBC-ENTMCNC: 31.5 PG — SIGNIFICANT CHANGE UP (ref 27–34)
MCHC RBC-ENTMCNC: 31.5 PG — SIGNIFICANT CHANGE UP (ref 27–34)
MCHC RBC-ENTMCNC: 32.9 GM/DL — SIGNIFICANT CHANGE UP (ref 32–36)
MCHC RBC-ENTMCNC: 32.9 GM/DL — SIGNIFICANT CHANGE UP (ref 32–36)
MCV RBC AUTO: 95.5 FL — SIGNIFICANT CHANGE UP (ref 80–100)
MCV RBC AUTO: 95.5 FL — SIGNIFICANT CHANGE UP (ref 80–100)
MONOCYTES # BLD AUTO: 0.74 K/UL — SIGNIFICANT CHANGE UP (ref 0–0.9)
MONOCYTES # BLD AUTO: 0.74 K/UL — SIGNIFICANT CHANGE UP (ref 0–0.9)
MONOCYTES NFR BLD AUTO: 16.1 % — HIGH (ref 2–14)
MONOCYTES NFR BLD AUTO: 16.1 % — HIGH (ref 2–14)
NEUTROPHILS # BLD AUTO: 2.03 K/UL — SIGNIFICANT CHANGE UP (ref 1.8–7.4)
NEUTROPHILS # BLD AUTO: 2.03 K/UL — SIGNIFICANT CHANGE UP (ref 1.8–7.4)
NEUTROPHILS NFR BLD AUTO: 44.3 % — SIGNIFICANT CHANGE UP (ref 43–77)
NEUTROPHILS NFR BLD AUTO: 44.3 % — SIGNIFICANT CHANGE UP (ref 43–77)
PLATELET # BLD AUTO: 257 K/UL — SIGNIFICANT CHANGE UP (ref 150–400)
PLATELET # BLD AUTO: 257 K/UL — SIGNIFICANT CHANGE UP (ref 150–400)
POTASSIUM SERPL-MCNC: 4 MMOL/L — SIGNIFICANT CHANGE UP (ref 3.5–5.3)
POTASSIUM SERPL-MCNC: 4 MMOL/L — SIGNIFICANT CHANGE UP (ref 3.5–5.3)
POTASSIUM SERPL-SCNC: 4 MMOL/L — SIGNIFICANT CHANGE UP (ref 3.5–5.3)
POTASSIUM SERPL-SCNC: 4 MMOL/L — SIGNIFICANT CHANGE UP (ref 3.5–5.3)
PROT SERPL-MCNC: 7.1 G/DL — SIGNIFICANT CHANGE UP (ref 6.6–8.7)
PROT SERPL-MCNC: 7.1 G/DL — SIGNIFICANT CHANGE UP (ref 6.6–8.7)
RAPID RVP RESULT: DETECTED
RAPID RVP RESULT: DETECTED
RBC # BLD: 4.48 M/UL — SIGNIFICANT CHANGE UP (ref 3.8–5.2)
RBC # BLD: 4.48 M/UL — SIGNIFICANT CHANGE UP (ref 3.8–5.2)
RBC # FLD: 12.5 % — SIGNIFICANT CHANGE UP (ref 10.3–14.5)
RBC # FLD: 12.5 % — SIGNIFICANT CHANGE UP (ref 10.3–14.5)
SARS-COV-2 RNA SPEC QL NAA+PROBE: DETECTED
SARS-COV-2 RNA SPEC QL NAA+PROBE: DETECTED
SODIUM SERPL-SCNC: 143 MMOL/L — SIGNIFICANT CHANGE UP (ref 135–145)
SODIUM SERPL-SCNC: 143 MMOL/L — SIGNIFICANT CHANGE UP (ref 135–145)
WBC # BLD: 4.59 K/UL — SIGNIFICANT CHANGE UP (ref 3.8–10.5)
WBC # BLD: 4.59 K/UL — SIGNIFICANT CHANGE UP (ref 3.8–10.5)
WBC # FLD AUTO: 4.59 K/UL — SIGNIFICANT CHANGE UP (ref 3.8–10.5)
WBC # FLD AUTO: 4.59 K/UL — SIGNIFICANT CHANGE UP (ref 3.8–10.5)

## 2023-10-28 PROCEDURE — 99284 EMERGENCY DEPT VISIT MOD MDM: CPT

## 2023-10-28 PROCEDURE — 83605 ASSAY OF LACTIC ACID: CPT

## 2023-10-28 PROCEDURE — 96375 TX/PRO/DX INJ NEW DRUG ADDON: CPT

## 2023-10-28 PROCEDURE — 80053 COMPREHEN METABOLIC PANEL: CPT

## 2023-10-28 PROCEDURE — T1013: CPT

## 2023-10-28 PROCEDURE — 85025 COMPLETE CBC W/AUTO DIFF WBC: CPT

## 2023-10-28 PROCEDURE — 71045 X-RAY EXAM CHEST 1 VIEW: CPT | Mod: 26

## 2023-10-28 PROCEDURE — 96374 THER/PROPH/DIAG INJ IV PUSH: CPT

## 2023-10-28 PROCEDURE — 36415 COLL VENOUS BLD VENIPUNCTURE: CPT

## 2023-10-28 PROCEDURE — 99284 EMERGENCY DEPT VISIT MOD MDM: CPT | Mod: 25

## 2023-10-28 PROCEDURE — 0225U NFCT DS DNA&RNA 21 SARSCOV2: CPT

## 2023-10-28 PROCEDURE — 71045 X-RAY EXAM CHEST 1 VIEW: CPT

## 2023-10-28 RX ORDER — NIRMATRELVIR AND RITONAVIR 150-100 MG
1 KIT ORAL
Qty: 1 | Refills: 0
Start: 2023-10-28 | End: 2023-11-01

## 2023-10-28 RX ORDER — FAMOTIDINE 10 MG/ML
20 INJECTION INTRAVENOUS ONCE
Refills: 0 | Status: COMPLETED | OUTPATIENT
Start: 2023-10-28 | End: 2023-10-28

## 2023-10-28 RX ORDER — FAMOTIDINE 10 MG/ML
1 INJECTION INTRAVENOUS
Qty: 28 | Refills: 0
Start: 2023-10-28 | End: 2023-11-10

## 2023-10-28 RX ORDER — ONDANSETRON 8 MG/1
4 TABLET, FILM COATED ORAL ONCE
Refills: 0 | Status: COMPLETED | OUTPATIENT
Start: 2023-10-28 | End: 2023-10-28

## 2023-10-28 RX ORDER — SODIUM CHLORIDE 9 MG/ML
1000 INJECTION INTRAMUSCULAR; INTRAVENOUS; SUBCUTANEOUS ONCE
Refills: 0 | Status: COMPLETED | OUTPATIENT
Start: 2023-10-28 | End: 2023-10-28

## 2023-10-28 RX ORDER — ONDANSETRON 8 MG/1
1 TABLET, FILM COATED ORAL
Qty: 9 | Refills: 0
Start: 2023-10-28 | End: 2023-10-30

## 2023-10-28 RX ADMIN — FAMOTIDINE 20 MILLIGRAM(S): 10 INJECTION INTRAVENOUS at 11:29

## 2023-10-28 RX ADMIN — ONDANSETRON 4 MILLIGRAM(S): 8 TABLET, FILM COATED ORAL at 11:29

## 2023-10-28 RX ADMIN — SODIUM CHLORIDE 1000 MILLILITER(S): 9 INJECTION INTRAMUSCULAR; INTRAVENOUS; SUBCUTANEOUS at 11:29

## 2023-10-28 NOTE — ED ADULT NURSE NOTE - NSFALLUNIVINTERV_ED_ALL_ED
Bed/Stretcher in lowest position, wheels locked, appropriate side rails in place/Call bell, personal items and telephone in reach/Instruct patient to call for assistance before getting out of bed/chair/stretcher/Non-slip footwear applied when patient is off stretcher/[place] to call system/Physically safe environment - no spills, clutter or unnecessary equipment/Purposeful proactive rounding/Room/bathroom lighting operational, light cord in reach

## 2023-10-28 NOTE — ED PROVIDER NOTE - OBJECTIVE STATEMENT
63-year-old female with history of hypothyroidism presents with flulike illness for 2 days with fever and sore throat along with diarrhea.  Patient had diarrhea 5-6 times last night and had a home test FOR COVID which was positive.  Patient felt weak and tired and came to ED for evaluation.  No syncope no chest pain no shortness of breath.  Patient is vaccinated for COVID    ED  Diane

## 2023-10-28 NOTE — ED PROVIDER NOTE - PATIENT PORTAL LINK FT
You can access the FollowMyHealth Patient Portal offered by [facility] by registering at the following website: [email or web address]. By joining [employer]’s FollowMyHealth portal, you will also be able to view your health information using other applications (apps) compatible with our system.

## 2023-10-28 NOTE — ED ADULT NURSE NOTE - OBJECTIVE STATEMENT
pt reports to the ED c/o of diarrhea since yesterday. pt a&ox3, significant other at bedside sates she has had 5-6 episodes of loose stool yesterday and wasn't feeling well. Son at home had a rapid COVID test and came back positive. Pt denies any other complaints including sob, chills, fever. RR wnl.

## 2023-10-28 NOTE — ED PROVIDER NOTE - NECK [-], MLM
CC:  Samanta Barnard is here today to see Dr. Capellan.    Medications: medications verified and updated  Refills needed today?  NO  denies Latex allergy or sensitivity  Tobacco history: verified    PMD - Tee Tiwari MD    Occupation:  Small Business Owner    Patient would like communication of their results via:      Cell Phone:   Telephone Information:   Mobile [phone]     Okay to leave a message containing results? Yes    Patient's current [facility] status: Inactive - Information given.  Health Maintenance   Topic Date Due   • Influenza Vaccine (1) 09/01/2021   • Depression Screening  09/28/2021   • Breast Cancer Screening  06/07/2022   • Cervical Cancer Screen 30-64 -  05/12/2026   • DTaP/Tdap/Td Vaccine (7 - Td or Tdap) 09/28/2030   • Pneumococcal Vaccine 0-64 (2 of 2 - PPSV23) 03/09/2037   • COVID-19 Vaccine  Completed   • Hepatitis B Vaccine  Aged Out   • Meningococcal Vaccine  Aged Out   • HPV Vaccine  Aged Out      no neck mass

## 2023-10-28 NOTE — ED PROVIDER NOTE - CLINICAL SUMMARY MEDICAL DECISION MAKING FREE TEXT BOX
Patient has flulike symptoms with diarrhea consistent with COVID illness.  Patient appears to have mild COVID illness will check labs x-ray to rule out pneumonia and if all the labs are normal will likely discharge home with Paxlovid Pepcid and Zofran

## 2023-10-28 NOTE — ED PROVIDER NOTE - NSFOLLOWUPINSTRUCTIONS_ED_ALL_ED_FT
Stay hydrated  Eat healthy food  Take medications as prescribed  Follow-up with your PCP in 1 to 2 weeks  Stay isolated for 10 days  Return promptly in case of worsening symptoms    Mantente hidratado   [place] comida edvin   Kosse los medicamentos según lo prescrito   Seguimiento con wilson PCP en 1 a 2 semanas   Permanecer aislado por 10 días   Regrese rápidamente en rosa maria de que los síntomas empeoren.

## 2023-10-28 NOTE — ED PROVIDER NOTE - PROGRESS NOTE DETAILS
reassured of mild COVID illness.  Prescribed Paxlovid Pepcid and Zofran for symptomatic relief.  Will discharge home with PCP follow-up

## 2023-11-22 NOTE — ED STATDOCS - SCRIBE NAME
"Subjective   Patient ID: Yonathan Ley is a 46 y.o. right hand dominant male  Pain of the Right Knee (Reports throbbing pain for 1 year, no known injury. Reports knees will buckle when walking ) and Pain of the Left Knee         Pain  Associated symptoms include arthralgias and joint swelling (left knee).   Patient presents with bilateral knee pain and swelling for about 1 year. No injury or trauma. He does notice a buckling sensation to bilateral knee when ambulating.  Denies numbness or tingling to BLE. Denies hip/groin pain.                                                   Past Medical History:   Diagnosis Date    Blood in stool         Past Surgical History:   Procedure Laterality Date    COLONOSCOPY      COLONOSCOPY N/A 2/21/2020    Procedure: COLONOSCOPY BIOPSY AN DPOLYPECTOMY;  Surgeon: Yoni Judge MD;  Location: [facility] ENDOSCOPY;  Service: Gastroenterology;  Laterality: N/A;    HERNIA REPAIR         Family History   Problem Relation Age of Onset    Ovarian cancer Mother     Diabetes Mother     Lung cancer Father     Diabetes Father        Social History     Socioeconomic History    Marital status:    Tobacco Use    Smoking status: Never    Smokeless tobacco: Current   Vaping Use    Vaping Use: Never used   Substance and Sexual Activity    Alcohol use: Never    Drug use: Never    Sexual activity: Yes     Partners: Female         Current Outpatient Medications:     meloxicam (Mobic) 15 MG tablet, Take 1 tablet by mouth Daily., Disp: 30 tablet, Rfl: 0    No Known Allergies    Review of Systems   Musculoskeletal:  Positive for arthralgias and joint swelling (left knee). Gait problem: bilateral knees.      I have reviewed the medical and surgical history, family history, social history, medications, and/or allergies, and the review of systems of this report.    Objective   /98 (BP Location: Left arm, Patient Position: Sitting, Cuff Size: Adult)   Ht 182.9 cm (72\")   Wt 92.2 kg (203 " lb 3.2 oz)   BMI 27.56 kg/m²    Physical Exam  Vitals and nursing note reviewed.   Constitutional:       Appearance: Normal appearance.   Pulmonary:      Effort: Pulmonary effort is normal.   Musculoskeletal:      Right knee: Crepitus present. No erythema or ecchymosis. Tenderness present over the medial joint line. No LCL laxity, MCL laxity, ACL laxity or PCL laxity. Normal meniscus.      Left knee: Crepitus present. No erythema or ecchymosis. Tenderness present over the medial joint line. No LCL laxity, MCL laxity, ACL laxity or PCL laxity.Normal meniscus.   Neurological:      Mental Status: He is alert and oriented to person, place, and time.       Ortho Exam   Extremity DVT signs are negative by clinical screen.   Neurologic Exam     Mental Status   Oriented to person, place, and time.          + Bilateral knee crepitus      Assessment & Plan   Independent Review of Radiographic Studies:      X-ray of the bilateral knee 3 view performed in the clinic independently   Reviewed by myself for the evaluation of knee pain.  No comparison films are available to review.  No acute fracture or dislocation.  There is bilateral patellofemoral joint space narrowing.  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  Large Joint Arthrocentesis: R knee  Date/Time: 11/22/2023 2:28 PM  Consent given by: patient  Site marked: site marked  Timeout: Immediately prior to procedure a time out was called to verify the correct patient, procedure, equipment, support staff and site/side marked as required   Supporting Documentation  Indications: pain   Procedure Details  Location: knee - R knee  Preparation: Patient was prepped and draped in the usual sterile fashion  Needle size: 23 G  Approach: anterolateral  Medications administered: 40 mg methylPREDNISolone acetate 40 MG/ML; 2 mL lidocaine 2%  Patient tolerance: patient tolerated the procedure well with no immediate complications      Large Joint  Arthrocentesis: L knee  Date/Time: 11/22/2023 2:28 PM  Consent given by: patient  Site marked: site marked  Timeout: Immediately prior to procedure a time out was called to verify the correct patient, procedure, equipment, support staff and site/side marked as required   Supporting Documentation  Indications: pain   Procedure Details  Location: knee - L knee  Preparation: Patient was prepped and draped in the usual sterile fashion  Needle size: 23 G  Approach: anterolateral  Medications administered: 40 mg methylPREDNISolone acetate 40 MG/ML; 2 mL lidocaine 2%  Patient tolerance: patient tolerated the procedure well with no immediate complications           Diagnoses and all orders for this visit:    1. Arthralgia of both knees (Primary)  -     meloxicam (Mobic) 15 MG tablet; Take 1 tablet by mouth Daily.  Dispense: 30 tablet; Refill: 0    2. Patellofemoral syndrome of both knees  -     meloxicam (Mobic) 15 MG tablet; Take 1 tablet by mouth Daily.  Dispense: 30 tablet; Refill: 0    3. Patellofemoral arthritis  -     meloxicam (Mobic) 15 MG tablet; Take 1 tablet by mouth Daily.  Dispense: 30 tablet; Refill: 0    Other orders  -     Large Joint Arthrocentesis: R knee  -     Large Joint Arthrocentesis: L knee       Orthopedic activities reviewed and patient expressed appreciation  Discussion of orthopedic goals  Risk, benefits, and merits of treatment alternatives reviewed with the patient and questions answered  Call or notify for any adverse effect from injection therapy    Recommendations/Plan:  Exercise, medications, injections, other patient advice, and return appointment as noted.  Patient is encouraged to call or return for any issues or concerns.    Follow up in 3 months      Patient agreeable to call or return sooner for any concerns.  Patient had an elevated blood pressure reading in the clinic today.  We discussed the risks associated with undiagnosed high blood pressure and/or chronic blood pressure  elevation.  I encouraged the patient to keep a daily blood pressure log by checking the blood pressure consistently once or twice daily for the next several days.  I informed the patient to take the blood pressure log to their primary care provider.  Report to the emergency room should you develop headache, chest pain or any concerning symptoms that you are not accustomed to experiencing.     BMI is >= 25 and <30. (Overweight) The following options were offered after discussion;: weight loss educational material (shared in after visit summary)             EMR Dragon-transcription disclaimer:  This encounter note is an electronic transcription of spoken language to printed text.  Electronic transcription of spoken language may permit erroneous or at times nonsensical words or phrases to be inadvertently transcribed.  Although I have reviewed the note for such errors, some may still exist   Ami Resendiz

## 2023-12-20 ENCOUNTER — EMERGENCY (EMERGENCY)
Facility: HOSPITAL | Age: 63
LOS: 1 days | Discharge: HOME | End: 2023-12-20
Attending: STUDENT IN AN ORGANIZED HEALTH CARE EDUCATION/TRAINING PROGRAM
Payer: MEDICAID

## 2023-12-20 VITALS
OXYGEN SATURATION: 96 % | SYSTOLIC BLOOD PRESSURE: 138 MMHG | HEART RATE: 90 BPM | WEIGHT: 138.45 LBS | TEMPERATURE: 98 F | RESPIRATION RATE: 20 BRPM | HEIGHT: 58 IN | DIASTOLIC BLOOD PRESSURE: 83 MMHG

## 2023-12-20 DIAGNOSIS — Z92.3 PERSONAL HISTORY OF IRRADIATION: Chronic | ICD-10-CM

## 2023-12-20 DIAGNOSIS — Z98.891 HISTORY OF UTERINE SCAR FROM PREVIOUS SURGERY: Chronic | ICD-10-CM

## 2023-12-20 PROCEDURE — 99285 EMERGENCY DEPT VISIT HI MDM: CPT

## 2023-12-20 NOTE — ED ADULT TRIAGE NOTE - HEART RATE (BEATS/MIN)
-- DO NOT REPLY / DO NOT REPLY ALL --  -- Message is from Engagement Center Operations (ECO) --    ONLY TO BE USED WITHIN A REFILL MEDICATION ENCOUNTER    Med Refill  Is the patient currently having any symptoms?: No/Non-Emergent symptoms    Name of medication requested: See pended med    Has patient contacted the pharmacy? Yes    Is this the first request for the medication in the last 48 hours?: Yes    Patient is requesting a medication refill - medication is on active medication list    Patient is currently OUT of the requested medication - sent as HIGH priority      Full name of the provider who ordered the medication: [facility] site name / Account # for provider: EDD Ureña    Preferred Pharmacy: Pharmacy  [facility] - [facility] - [facility]    Patient confirmed the above pharmacy as correct?  Yes      Caller Information       Type Contact Phone/Fax    12/12/2022 04:21 PM CST Phone (Incoming) Prem Nava (Self) [phone] (M)          Alternative phone number: [phone]    Can a detailed message be left?: Yes    Patient is completely out of medication: Verify if patient is currently experiencing symptoms. If patient is symptomatic, proceed with front end triage instead of medication refill. If patient is not symptomatic but is completely out of medication, ronda as High priority when routing. Inform patient: “Please call back with any questions or concerns and if your condition becomes life threatening, you should seek immediate medical assistance by calling 911 or going to the Emergency Department for evaluation.”    Inform all patients: \"If the clinical team needs to contact you regarding this refill, please be aware the return phone call may come from an unidentified or out of state phone number and your refill request will be addressed as soon as the clinical team reviews your message.\"   90

## 2023-12-20 NOTE — ED ADULT TRIAGE NOTE - MEANS OF ARRIVAL
ambulatory Comment: REPEAT efudex BID x 3 weeks on dorsal hands and scalp (patient to repeat in April 2023). \\n\\nPatient repeated in November 2022 Efudex BID x 2 weeks to scalp, forehead, forearms and dorsal hands\\n\\nContinue Nicatinamide 500 mg BID Render Risk Assessment In Note?: no Detail Level: Zone Comment: Pt opted to call office when they need refill of 5FU

## 2023-12-20 NOTE — ED ADULT TRIAGE NOTE - CHIEF COMPLAINT QUOTE
Patient presents to ED with c/o abdominal pain associated with vomiting and diarrhea since yesterday.  Took Pepto-Bismol at 1900 with no relief.

## 2023-12-21 VITALS
SYSTOLIC BLOOD PRESSURE: 130 MMHG | HEART RATE: 71 BPM | OXYGEN SATURATION: 99 % | RESPIRATION RATE: 19 BRPM | TEMPERATURE: 98 F | DIASTOLIC BLOOD PRESSURE: 78 MMHG

## 2023-12-21 LAB
ALBUMIN SERPL ELPH-MCNC: 4.8 G/DL — SIGNIFICANT CHANGE UP (ref 3.3–5.2)
ALBUMIN SERPL ELPH-MCNC: 4.8 G/DL — SIGNIFICANT CHANGE UP (ref 3.3–5.2)
ALP SERPL-CCNC: 76 U/L — SIGNIFICANT CHANGE UP (ref 40–120)
ALP SERPL-CCNC: 76 U/L — SIGNIFICANT CHANGE UP (ref 40–120)
ALT FLD-CCNC: 18 U/L — SIGNIFICANT CHANGE UP
ALT FLD-CCNC: 18 U/L — SIGNIFICANT CHANGE UP
ANION GAP SERPL CALC-SCNC: 14 MMOL/L — SIGNIFICANT CHANGE UP (ref 5–17)
ANION GAP SERPL CALC-SCNC: 14 MMOL/L — SIGNIFICANT CHANGE UP (ref 5–17)
AST SERPL-CCNC: 21 U/L — SIGNIFICANT CHANGE UP
AST SERPL-CCNC: 21 U/L — SIGNIFICANT CHANGE UP
BASOPHILS # BLD AUTO: 0.07 K/UL — SIGNIFICANT CHANGE UP (ref 0–0.2)
BASOPHILS # BLD AUTO: 0.07 K/UL — SIGNIFICANT CHANGE UP (ref 0–0.2)
BASOPHILS NFR BLD AUTO: 0.4 % — SIGNIFICANT CHANGE UP (ref 0–2)
BASOPHILS NFR BLD AUTO: 0.4 % — SIGNIFICANT CHANGE UP (ref 0–2)
BILIRUB SERPL-MCNC: 0.3 MG/DL — LOW (ref 0.4–2)
BILIRUB SERPL-MCNC: 0.3 MG/DL — LOW (ref 0.4–2)
BUN SERPL-MCNC: 19.3 MG/DL — SIGNIFICANT CHANGE UP (ref 8–20)
BUN SERPL-MCNC: 19.3 MG/DL — SIGNIFICANT CHANGE UP (ref 8–20)
CALCIUM SERPL-MCNC: 9.4 MG/DL — SIGNIFICANT CHANGE UP (ref 8.4–10.5)
CALCIUM SERPL-MCNC: 9.4 MG/DL — SIGNIFICANT CHANGE UP (ref 8.4–10.5)
CHLORIDE SERPL-SCNC: 105 MMOL/L — SIGNIFICANT CHANGE UP (ref 96–108)
CHLORIDE SERPL-SCNC: 105 MMOL/L — SIGNIFICANT CHANGE UP (ref 96–108)
CO2 SERPL-SCNC: 20 MMOL/L — LOW (ref 22–29)
CO2 SERPL-SCNC: 20 MMOL/L — LOW (ref 22–29)
CREAT SERPL-MCNC: 0.58 MG/DL — SIGNIFICANT CHANGE UP (ref 0.5–1.3)
CREAT SERPL-MCNC: 0.58 MG/DL — SIGNIFICANT CHANGE UP (ref 0.5–1.3)
EGFR: 100 ML/MIN/1.73M2 — SIGNIFICANT CHANGE UP
EGFR: 100 ML/MIN/1.73M2 — SIGNIFICANT CHANGE UP
EOSINOPHIL # BLD AUTO: 0.13 K/UL — SIGNIFICANT CHANGE UP (ref 0–0.5)
EOSINOPHIL # BLD AUTO: 0.13 K/UL — SIGNIFICANT CHANGE UP (ref 0–0.5)
EOSINOPHIL NFR BLD AUTO: 0.8 % — SIGNIFICANT CHANGE UP (ref 0–6)
EOSINOPHIL NFR BLD AUTO: 0.8 % — SIGNIFICANT CHANGE UP (ref 0–6)
GLUCOSE SERPL-MCNC: 155 MG/DL — HIGH (ref 70–99)
GLUCOSE SERPL-MCNC: 155 MG/DL — HIGH (ref 70–99)
HCT VFR BLD CALC: 46.1 % — HIGH (ref 34.5–45)
HCT VFR BLD CALC: 46.1 % — HIGH (ref 34.5–45)
HGB BLD-MCNC: 15.7 G/DL — HIGH (ref 11.5–15.5)
HGB BLD-MCNC: 15.7 G/DL — HIGH (ref 11.5–15.5)
IMM GRANULOCYTES NFR BLD AUTO: 0.6 % — SIGNIFICANT CHANGE UP (ref 0–0.9)
IMM GRANULOCYTES NFR BLD AUTO: 0.6 % — SIGNIFICANT CHANGE UP (ref 0–0.9)
LIDOCAIN IGE QN: 22 U/L — SIGNIFICANT CHANGE UP (ref 22–51)
LIDOCAIN IGE QN: 22 U/L — SIGNIFICANT CHANGE UP (ref 22–51)
LYMPHOCYTES # BLD AUTO: 0.54 K/UL — LOW (ref 1–3.3)
LYMPHOCYTES # BLD AUTO: 0.54 K/UL — LOW (ref 1–3.3)
LYMPHOCYTES # BLD AUTO: 3.4 % — LOW (ref 13–44)
LYMPHOCYTES # BLD AUTO: 3.4 % — LOW (ref 13–44)
MCHC RBC-ENTMCNC: 32.2 PG — SIGNIFICANT CHANGE UP (ref 27–34)
MCHC RBC-ENTMCNC: 32.2 PG — SIGNIFICANT CHANGE UP (ref 27–34)
MCHC RBC-ENTMCNC: 34.1 GM/DL — SIGNIFICANT CHANGE UP (ref 32–36)
MCHC RBC-ENTMCNC: 34.1 GM/DL — SIGNIFICANT CHANGE UP (ref 32–36)
MCV RBC AUTO: 94.7 FL — SIGNIFICANT CHANGE UP (ref 80–100)
MCV RBC AUTO: 94.7 FL — SIGNIFICANT CHANGE UP (ref 80–100)
MONOCYTES # BLD AUTO: 0.61 K/UL — SIGNIFICANT CHANGE UP (ref 0–0.9)
MONOCYTES # BLD AUTO: 0.61 K/UL — SIGNIFICANT CHANGE UP (ref 0–0.9)
MONOCYTES NFR BLD AUTO: 3.9 % — SIGNIFICANT CHANGE UP (ref 2–14)
MONOCYTES NFR BLD AUTO: 3.9 % — SIGNIFICANT CHANGE UP (ref 2–14)
NEUTROPHILS # BLD AUTO: 14.26 K/UL — HIGH (ref 1.8–7.4)
NEUTROPHILS # BLD AUTO: 14.26 K/UL — HIGH (ref 1.8–7.4)
NEUTROPHILS NFR BLD AUTO: 90.9 % — HIGH (ref 43–77)
NEUTROPHILS NFR BLD AUTO: 90.9 % — HIGH (ref 43–77)
PLATELET # BLD AUTO: 306 K/UL — SIGNIFICANT CHANGE UP (ref 150–400)
PLATELET # BLD AUTO: 306 K/UL — SIGNIFICANT CHANGE UP (ref 150–400)
POTASSIUM SERPL-MCNC: 4.1 MMOL/L — SIGNIFICANT CHANGE UP (ref 3.5–5.3)
POTASSIUM SERPL-MCNC: 4.1 MMOL/L — SIGNIFICANT CHANGE UP (ref 3.5–5.3)
POTASSIUM SERPL-SCNC: 4.1 MMOL/L — SIGNIFICANT CHANGE UP (ref 3.5–5.3)
POTASSIUM SERPL-SCNC: 4.1 MMOL/L — SIGNIFICANT CHANGE UP (ref 3.5–5.3)
PROT SERPL-MCNC: 7.7 G/DL — SIGNIFICANT CHANGE UP (ref 6.6–8.7)
PROT SERPL-MCNC: 7.7 G/DL — SIGNIFICANT CHANGE UP (ref 6.6–8.7)
RBC # BLD: 4.87 M/UL — SIGNIFICANT CHANGE UP (ref 3.8–5.2)
RBC # BLD: 4.87 M/UL — SIGNIFICANT CHANGE UP (ref 3.8–5.2)
RBC # FLD: 13 % — SIGNIFICANT CHANGE UP (ref 10.3–14.5)
RBC # FLD: 13 % — SIGNIFICANT CHANGE UP (ref 10.3–14.5)
SODIUM SERPL-SCNC: 139 MMOL/L — SIGNIFICANT CHANGE UP (ref 135–145)
SODIUM SERPL-SCNC: 139 MMOL/L — SIGNIFICANT CHANGE UP (ref 135–145)
WBC # BLD: 15.7 K/UL — HIGH (ref 3.8–10.5)
WBC # BLD: 15.7 K/UL — HIGH (ref 3.8–10.5)
WBC # FLD AUTO: 15.7 K/UL — HIGH (ref 3.8–10.5)
WBC # FLD AUTO: 15.7 K/UL — HIGH (ref 3.8–10.5)

## 2023-12-21 PROCEDURE — 96375 TX/PRO/DX INJ NEW DRUG ADDON: CPT

## 2023-12-21 PROCEDURE — 36415 COLL VENOUS BLD VENIPUNCTURE: CPT

## 2023-12-21 PROCEDURE — 96374 THER/PROPH/DIAG INJ IV PUSH: CPT

## 2023-12-21 PROCEDURE — G1004: CPT

## 2023-12-21 PROCEDURE — 83690 ASSAY OF LIPASE: CPT

## 2023-12-21 PROCEDURE — 74177 CT ABD & PELVIS W/CONTRAST: CPT | Mod: MG

## 2023-12-21 PROCEDURE — 80053 COMPREHEN METABOLIC PANEL: CPT

## 2023-12-21 PROCEDURE — 85025 COMPLETE CBC W/AUTO DIFF WBC: CPT

## 2023-12-21 PROCEDURE — 99284 EMERGENCY DEPT VISIT MOD MDM: CPT | Mod: 25

## 2023-12-21 PROCEDURE — 74177 CT ABD & PELVIS W/CONTRAST: CPT | Mod: 26,MG

## 2023-12-21 RX ORDER — PANTOPRAZOLE SODIUM 20 MG/1
1 TABLET, DELAYED RELEASE ORAL
Qty: 30 | Refills: 0
Start: 2023-12-21 | End: 2024-01-19

## 2023-12-21 RX ORDER — ONDANSETRON 8 MG/1
1 TABLET, FILM COATED ORAL
Qty: 1 | Refills: 0
Start: 2023-12-21

## 2023-12-21 RX ORDER — FAMOTIDINE 10 MG/ML
20 INJECTION INTRAVENOUS ONCE
Refills: 0 | Status: COMPLETED | OUTPATIENT
Start: 2023-12-21 | End: 2023-12-21

## 2023-12-21 RX ORDER — SUCRALFATE 1 G
10 TABLET ORAL
Qty: 200 | Refills: 0
Start: 2023-12-21 | End: 2024-01-03

## 2023-12-21 RX ORDER — SODIUM CHLORIDE 9 MG/ML
1000 INJECTION INTRAMUSCULAR; INTRAVENOUS; SUBCUTANEOUS ONCE
Refills: 0 | Status: COMPLETED | OUTPATIENT
Start: 2023-12-21 | End: 2023-12-21

## 2023-12-21 RX ORDER — KETOROLAC TROMETHAMINE 30 MG/ML
15 SYRINGE (ML) INJECTION ONCE
Refills: 0 | Status: DISCONTINUED | OUTPATIENT
Start: 2023-12-21 | End: 2023-12-21

## 2023-12-21 RX ORDER — ONDANSETRON 8 MG/1
4 TABLET, FILM COATED ORAL ONCE
Refills: 0 | Status: COMPLETED | OUTPATIENT
Start: 2023-12-21 | End: 2023-12-21

## 2023-12-21 RX ADMIN — SODIUM CHLORIDE 1000 MILLILITER(S): 9 INJECTION INTRAMUSCULAR; INTRAVENOUS; SUBCUTANEOUS at 01:22

## 2023-12-21 RX ADMIN — ONDANSETRON 4 MILLIGRAM(S): 8 TABLET, FILM COATED ORAL at 01:22

## 2023-12-21 RX ADMIN — FAMOTIDINE 20 MILLIGRAM(S): 10 INJECTION INTRAVENOUS at 01:22

## 2023-12-21 RX ADMIN — Medication 15 MILLIGRAM(S): at 01:22

## 2023-12-21 RX ADMIN — Medication 30 MILLILITER(S): at 01:22

## 2023-12-21 NOTE — ED PROVIDER NOTE - NS ED ROS FT
Constitutional: no fever  CV: no chest pain  Resp: no cough, no shortness of breath  GI: +abdominal pain, +vomiting, +diarrhea  : no dysuria  MSK: no joint pain  Neuro: no headache

## 2023-12-21 NOTE — ED ADULT NURSE NOTE - NSFALLUNIVINTERV_ED_ALL_ED
Bed/Stretcher in lowest position, wheels locked, appropriate side rails in place/Call bell, personal items and telephone in reach/Instruct patient to call for assistance before getting out of bed/chair/stretcher/Non-slip footwear applied when patient is off stretcher/[place] to call system/Physically safe environment - no spills, clutter or unnecessary equipment/Purposeful proactive rounding/Room/bathroom lighting operational, light cord in reach Bed/Stretcher in lowest position, wheels locked, appropriate side rails in place/Call bell, personal items and telephone in reach/Instruct patient to call for assistance before getting out of bed/chair/stretcher/Non-slip footwear applied when patient is off stretcher/[place] to call system/Physically safe environment - no spills, clutter or unnecessary equipment/Purposeful proactive rounding/Room/bathroom lighting operational, light cord in reach

## 2023-12-21 NOTE — ED PROVIDER NOTE - CLINICAL SUMMARY MEDICAL DECISION MAKING FREE TEXT BOX
63y F w/ HLD, hypothyroid, presents for 2 days of vomiting and diarrhea. Pt well appearing with stable VS, mild diffuse abdominal tenderness on exam. Will check labs, treat symptomatically. Consider abdominal imaging if symptoms not improved with treatment. 63y F w/ HLD, hypothyroid, presents for 2 days of vomiting and diarrhea. Pt well appearing with stable VS, mild diffuse abdominal tenderness on exam. Will check labs, treat symptomatically. Consider abdominal imaging if symptoms not improved with treatment.    Labs notable for leukocytosis, otherwise unremarkable. CT suggestive of gastroenteritis -- likely viral. Medically stable for discharge with return precautions.

## 2023-12-21 NOTE — ED PROVIDER NOTE - OBJECTIVE STATEMENT
63y F w/ hx HLD, hypothyroidism, congenital single kidney, presents for 2 days of nonbloody vomiting and diarrhea. Reports associated generalized abdominal discomfort. No fever, cough, urinary symptoms. No known sick contacts, new foods, recent travel. Took Pepto-Bismol prior to arrival without relief.

## 2023-12-21 NOTE — ED PROVIDER NOTE - NSFOLLOWUPINSTRUCTIONS_ED_ALL_ED_FT
Gastroenteritis viral en los adultos  Viral Gastroenteritis, Adult  Body outline showing the digestive tract, including the stomach, small intestine, and large intestine.  La gastroenteritis viral también se conoce cathy gripe estomacal. Esta afección podría afectar el estómago, el intestino meredith y el intestino grueso. Puede causar diarrea líquida, fiebre y vómitos repentinos. Esta afección es causada por muchos virus diferentes. Estos virus pueden transmitirse de charlie persona a otra con mucha facilidad (son contagiosos).    La diarrea y los vómitos pueden hacerlo sentir débil y causar deshidratación. Es posible que no pueda retener los líquidos. La deshidratación puede causarle cansancio, sed, sequedad en la boca y disminución en la frecuencia con la que orina. Es importante restituir los líquidos que pierde por causa de la diarrea y los vómitos.    ¿Cuáles son las causas?  La gastroenteritis es causada por muchos virus, entre los que se incluyen el rotavirus y el norovirus. El norovirus es la causa más frecuente en los adultos. Puede enfermarse después de estar expuesto a los virus de otras personas. También puede enfermarse de las siguientes maneras:  A través de la ingesta de alimentos o agua contaminados, o por tocar superficies contaminadas con alguno de estos virus.  Al compartir utensilios u otros artículos personales con charlie persona infectada.  ¿Qué incrementa el riesgo?  Es más probable que tenga esta afección si:  Tiene debilitado el sistema de defensa del organismo (sistema inmunitario).  Vive con morena o más niños menores de 2 años.  Vive en un hogar de ancianos.  Viaja en un crucero.  ¿Cuáles son los signos o síntomas?  Los síntomas de esta afección suelen aparecer entre 1 y 3 días después de la exposición al virus. Pueden durar algunos días o incluso charlie semana. Los síntomas frecuentes son diarrea líquida y vómitos. Otros síntomas pueden incluir los siguientes:  Fiebre.  Dolor de ayesha.  Fatiga.  Dolor en el abdomen.  Escalofríos.  Debilidad.  Náuseas.  Kristen musculares.  Pérdida del apetito.  ¿Cómo se diagnostica?  Esta afección se diagnostica mediante charlie revisión de los antecedentes médicos y un examen físico. También podrían hacerle un análisis de materia fecal para detectar virus u otras infecciones.    ¿Cómo se trata?  Por lo general, esta afección desaparece por sí kajal. El tratamiento se centra en prevenir la deshidratación y reponer los líquidos perdidos (rehidratación). El tratamiento de esta afección puede incluir:  Charlie solución de rehidratación oral (SRO) para reemplazar sales y minerales (electrolitos) importantes en el cuerpo. Tómela si se lo indicó el médico. Esta es charlie bebida que se vende en farmacias y tiendas minoristas.  Medicamentos para aliviar los síntomas.  Suplementos probióticos para disminuir los síntomas de diarrea.  Administración de líquidos por vía intravenosa si la deshidratación es grave.  Los adultos mayores y las personas que tienen otras enfermedades o un sistema inmunitario débil están en mayor riesgo de deshidratación.    Siga estas indicaciones en wilson casa:  Comida y bebida    A comparison of three sample cups showing dark yellow, yellow, and pale yellow urine.  Dawn charlie SRO cathy se lo haya indicado el médico.  En la medida en que pueda, melly líquidos transparentes en pequeñas cantidades. Los líquidos transparentes son, por ejemplo:  Agua.  Trocitos de hielo.  Jugo de frutas diluido.  Bebidas deportivas de bajas calorías.  Melly suficiente líquido cathy para mantener la orina de color amarillo pálido.  Coma pequeñas cantidades de alimentos saludables cada 3 a 4 horas según wilson tolerancia. Estos pueden incluir cereales integrales, frutas, verduras, didi magras y yogur.  Evite consumir líquidos que contengan mucha azúcar o cafeína, cathy bebidas energéticas, bebidas deportivas y refrescos.  Evite los alimentos condimentados o con alto contenido de grasa.  Evite kavitha alcohol.  Indicaciones generales    Washing hands with soap and water.  Lávese las jerrod con frecuencia, especialmente después de tener diarrea o vómitos. Use desinfectante para jerrod si no dispone de agua y jabón.  Asegúrese de que todas las personas que viven en wilson casa se laven papi las jerrod y con frecuencia.  Use los medicamentos de venta mejia y los recetados solamente cathy se lo haya indicado el médico.  Descanse en wilson casa mientras se recupera.  Controle wilson afección para detectar cualquier cambio.  Dawn un baño caliente para ayudar a disminuir el ardor o el dolor causados por los episodios frecuentes de diarrea.  Concurra a todas las visitas de seguimiento. [place] es importante.  Comuníquese con un médico si:  No retiene los líquidos.  Tiene síntomas que empeoran.  Tiene nuevos síntomas.  Se siente mareado o siente que va a desvanecerse.  Presenta calambres musculares.  Solicite ayuda de inmediato si:  Siente dolor en el pecho.  Tiene problemas para respirar o respira muy rápidamente.  Tiene latidos cardíacos acelerados.  Se siente muy débil o se desmaya.  Siente dolor de ayesha intenso, rigidez en el guillermo, o ambas cosas.  Tiene charlie erupción cutánea.  Tiene dolor intenso, cólicos o distensión en el abdomen.  Tiene la piel fría y húmeda.  Se siente confundido.  Tiene dolor al orinar.  Tiene signos de deshidratación, cathy los siguientes:  Orina de color oscuro, muy escasa o falta de orina.  Labios agrietados.  Sequedad de boca.  Ojos hundidos.  Somnolencia.  Debilidad.  Presenta signos de sangrado, cathy los siguientes:  Observa jonelle en el vómito.  Tiene vómitos que se asemejan al poso del café.  Hace heces sanguinolentas, negras o con aspecto alquitranado.  Estos síntomas pueden indicar charlie emergencia. Solicite ayuda de inmediato. Llame al 911.  No espere a abelino si los síntomas desaparecen.  No conduzca por armando propios medios hasta el hospital.  Resumen  La gastroenteritis viral también se conoce cathy gripe estomacal. Puede causar diarrea líquida, fiebre y vómitos repentinos.  Esta afección se puede transmitir de charlie persona a otra con mucha facilidad (es contagiosa).  Melly charlie solución de rehidratación oral (SRO) si se lo indicó el médico. Esta es charlie bebida que se vende en farmacias y tiendas minoristas.  Lávese las jerrod con frecuencia, especialmente después de tener diarrea o vómitos. Use desinfectante para jerrod si no dispone de tricia y madyson.  Esta información no tiene cathy fin reemplazar el consejo del médico. Asegúrese de hacerle al médico cualquier pregunta que tenga. Gastroenteritis viral en los adultos  Viral Gastroenteritis, Adult  Body outline showing the digestive tract, including the stomach, small intestine, and large intestine.  La gastroenteritis viral también se conoce cathy gripe estomacal. Esta afección podría afectar el estómago, el intestino meredith y el intestino grueso. Puede causar diarrea líquida, fiebre y vómitos repentinos. Esta afección es causada por muchos virus diferentes. Estos virus pueden transmitirse de charlie persona a otra con mucha facilidad (son contagiosos).    La diarrea y los vómitos pueden hacerlo sentir débil y causar deshidratación. Es posible que no pueda retener los líquidos. La deshidratación puede causarle cansancio, sed, sequedad en la boca y disminución en la frecuencia con la que orina. Es importante restituir los líquidos que pierde por causa de la diarrea y los vómitos.    ¿Cuáles son las causas?  La gastroenteritis es causada por muchos virus, entre los que se incluyen el rotavirus y el norovirus. El norovirus es la causa más frecuente en los adultos. Puede enfermarse después de estar expuesto a los virus de otras personas. También puede enfermarse de las siguientes maneras:  A través de la ingesta de alimentos o agua contaminados, o por tocar superficies contaminadas con alguno de estos virus.  Al compartir utensilios u otros artículos personales con charlie persona infectada.  ¿Qué incrementa el riesgo?  Es más probable que tenga esta afección si:  Tiene debilitado el sistema de defensa del organismo (sistema inmunitario).  Vive con morena o más niños menores de 2 años.  Vive en un hogar de ancianos.  Viaja en un crucero.  ¿Cuáles son los signos o síntomas?  Los síntomas de esta afección suelen aparecer entre 1 y 3 días después de la exposición al virus. Pueden durar algunos días o incluso charlie semana. Los síntomas frecuentes son diarrea líquida y vómitos. Otros síntomas pueden incluir los siguientes:  Fiebre.  Dolor de ayesha.  Fatiga.  Dolor en el abdomen.  Escalofríos.  Debilidad.  Náuseas.  Kristen musculares.  Pérdida del apetito.  ¿Cómo se diagnostica?  Esta afección se diagnostica mediante charlie revisión de los antecedentes médicos y un examen físico. También podrían hacerle un análisis de materia fecal para detectar virus u otras infecciones.    ¿Cómo se trata?  Por lo general, esta afección desaparece por sí kajal. El tratamiento se centra en prevenir la deshidratación y reponer los líquidos perdidos (rehidratación). El tratamiento de esta afección puede incluir:  Charlie solución de rehidratación oral (SRO) para reemplazar sales y minerales (electrolitos) importantes en el cuerpo. Tómela si se lo indicó el médico. Esta es charlie bebida que se vende en farmacias y tiendas minoristas.  Medicamentos para aliviar los síntomas.  Suplementos probióticos para disminuir los síntomas de diarrea.  Administración de líquidos por vía intravenosa si la deshidratación es grave.  Los adultos mayores y las personas que tienen otras enfermedades o un sistema inmunitario débil están en mayor riesgo de deshidratación.    Siga estas indicaciones en wilson casa:  Comida y bebida    A comparison of three sample cups showing dark yellow, yellow, and pale yellow urine.  Nowthen charlie SRO cathy se lo haya indicado el médico.  En la medida en que pueda, melly líquidos transparentes en pequeñas cantidades. Los líquidos transparentes son, por ejemplo:  Agua.  Trocitos de hielo.  Jugo de frutas diluido.  Bebidas deportivas de bajas calorías.  Melly suficiente líquido cathy para mantener la orina de color amarillo pálido.  Coma pequeñas cantidades de alimentos saludables cada 3 a 4 horas según wilson tolerancia. Estos pueden incluir cereales integrales, frutas, verduras, didi magras y yogur.  Evite consumir líquidos que contengan mucha azúcar o cafeína, cathy bebidas energéticas, bebidas deportivas y refrescos.  Evite los alimentos condimentados o con alto contenido de grasa.  Evite kavitha alcohol.  Indicaciones generales    Washing hands with soap and water.  Lávese las jerrod con frecuencia, especialmente después de tener diarrea o vómitos. Use desinfectante para jerrod si no dispone de agua y jabón.  Asegúrese de que todas las personas que viven en wilson casa se laven papi las jerrod y con frecuencia.  Use los medicamentos de venta mejia y los recetados solamente cathy se lo haya indicado el médico.  Descanse en wilson casa mientras se recupera.  Controle wilson afección para detectar cualquier cambio.  Nowthen un baño caliente para ayudar a disminuir el ardor o el dolor causados por los episodios frecuentes de diarrea.  Concurra a todas las visitas de seguimiento. [place] es importante.  Comuníquese con un médico si:  No retiene los líquidos.  Tiene síntomas que empeoran.  Tiene nuevos síntomas.  Se siente mareado o siente que va a desvanecerse.  Presenta calambres musculares.  Solicite ayuda de inmediato si:  Siente dolor en el pecho.  Tiene problemas para respirar o respira muy rápidamente.  Tiene latidos cardíacos acelerados.  Se siente muy débil o se desmaya.  Siente dolor de ayesha intenso, rigidez en el guillermo, o ambas cosas.  Tiene charlie erupción cutánea.  Tiene dolor intenso, cólicos o distensión en el abdomen.  Tiene la piel fría y húmeda.  Se siente confundido.  Tiene dolor al orinar.  Tiene signos de deshidratación, cathy los siguientes:  Orina de color oscuro, muy escasa o falta de orina.  Labios agrietados.  Sequedad de boca.  Ojos hundidos.  Somnolencia.  Debilidad.  Presenta signos de sangrado, cathy los siguientes:  Observa jonelle en el vómito.  Tiene vómitos que se asemejan al poso del café.  Hace heces sanguinolentas, negras o con aspecto alquitranado.  Estos síntomas pueden indicar charlie emergencia. Solicite ayuda de inmediato. Llame al 911.  No espere a abelino si los síntomas desaparecen.  No conduzca por armando propios medios hasta el hospital.  Resumen  La gastroenteritis viral también se conoce cathy gripe estomacal. Puede causar diarrea líquida, fiebre y vómitos repentinos.  Esta afección se puede transmitir de charlie persona a otra con mucha facilidad (es contagiosa).  Melly charlie solución de rehidratación oral (SRO) si se lo indicó el médico. Esta es charlie bebida que se vende en farmacias y tiendas minoristas.  Lávese las jerrod con frecuencia, especialmente después de tener diarrea o vómitos. Use desinfectante para jerrod si no dispone de tricia y madyson.  Esta información no tiene cathy fin reemplazar el consejo del médico. Asegúrese de hacerle al médico cualquier pregunta que tenga.

## 2023-12-21 NOTE — ED PROVIDER NOTE - CARE PROVIDER_API CALL
Shala Jansen  Gastroenterology  [address] [facility] [address], [facility] [address]  [place] [address]  Phone: [phone]  Fax: [phone]  Follow Up Time:    Shala Jansen  Gastroenterology  [address] [facility], [facility] [address]  [place] [address]  Phone: [phone]  Fax: [phone]  Follow Up Time:

## 2023-12-21 NOTE — ED PROVIDER NOTE - PATIENT PORTAL LINK FT
You can access the FollowMyHealth Patient Portal offered by [facility] by registering at the following website: [email or web address]. By joining [employer]’s FollowMyHealth portal, you will also be able to view your health information using other applications (apps) compatible with our system. You can access the FollowMyHealth Patient Portal offered by [facility] by registering at the following website: [email or web address]. By joining [employer]’s FollowMyHealth portal, you will also be able to view your health information using other applications (apps) compatible with our system.

## 2023-12-21 NOTE — ED PROVIDER NOTE - PHYSICAL EXAMINATION
Constitutional: Awake, alert, in no acute distress  Eyes: no scleral icterus  HENT: normocephalic, atraumatic, moist oral mucosa  Neck: supple  CV: RRR, no murmur  Pulm: non-labored respirations, CTAB  Abdomen: soft, +mild diffuse tenderness, no rebound or guarding, non-distended, no CVAT  Extremities: no edema, no deformity  Skin: no rash, no jaundice  Neuro: AAOx3, moving all extremities equally

## 2023-12-21 NOTE — ED PROVIDER NOTE - PROGRESS NOTE DETAILS
Nicolas: Pt feeling better, still with b/l lower quadrant abdominal tenderness on exam. Pending CT. Nicolas: Pt feeling better, tolerating PO. CT showing likely gastroenteritis. Medically stable for discharge with return precautions. Will give referral to GI.

## 2023-12-21 NOTE — ED ADULT NURSE NOTE - OBJECTIVE STATEMENT
patient presents complaining of generalized abdominal pain, nausea and diarrhea since yesterday. patient denies fever, vomiting or urinary symptoms.

## 2024-03-09 ENCOUNTER — OUTPATIENT (OUTPATIENT)
Dept: OUTPATIENT SERVICES | Facility: HOSPITAL | Age: 64
LOS: 1 days | End: 2024-03-09
Payer: COMMERCIAL

## 2024-03-09 ENCOUNTER — APPOINTMENT (OUTPATIENT)
Dept: MAMMOGRAPHY | Facility: CLINIC | Age: 64
End: 2024-03-09

## 2024-03-09 DIAGNOSIS — Z98.891 HISTORY OF UTERINE SCAR FROM PREVIOUS SURGERY: Chronic | ICD-10-CM

## 2024-03-09 DIAGNOSIS — Z12.31 ENCOUNTER FOR SCREENING MAMMOGRAM FOR MALIGNANT NEOPLASM OF BREAST: ICD-10-CM

## 2024-03-09 DIAGNOSIS — Z92.3 PERSONAL HISTORY OF IRRADIATION: Chronic | ICD-10-CM

## 2024-03-09 PROCEDURE — 77063 BREAST TOMOSYNTHESIS BI: CPT

## 2024-03-09 PROCEDURE — 77067 SCR MAMMO BI INCL CAD: CPT | Mod: 26

## 2024-03-09 PROCEDURE — 77063 BREAST TOMOSYNTHESIS BI: CPT | Mod: 26

## 2024-03-09 PROCEDURE — 77067 SCR MAMMO BI INCL CAD: CPT

## 2024-06-10 NOTE — ED PROVIDER NOTE - CARE PLAN
Prescriptions electronically submitted to pharmacy from [facility] Take over the counter pain medication/Prescriptions electronically submitted to pharmacy from [facility] Principal Discharge DX:	[facility]

## 2024-06-20 NOTE — ED ADULT NURSE NOTE - PT NEEDS ASSIST
[Scleral Icterus] : Scleral Icterus noted [Non-Tender] : non-tender [Irregular] : irregular [General Appearance - Alert] : alert [General Appearance - In No Acute Distress] : in no acute distress [Sclera] : the sclera and conjunctiva were normal [Outer Ear] : the ears and nose were normal in appearance [Oropharynx] : the oropharynx was normal [Neck Appearance] : the appearance of the neck was normal [Neck Cervical Mass (___cm)] : no neck mass was observed [Jugular Venous Distention Increased] : there was no jugular-venous distention [Thyroid Diffuse Enlargement] : the thyroid was not enlarged [Thyroid Nodule] : there were no palpable thyroid nodules [Auscultation Breath Sounds / Voice Sounds] : lungs were clear to auscultation bilaterally [Heart Rate And Rhythm] : heart rate was normal and rhythm regular [Edema] : there was no peripheral edema [Bowel Sounds] : normal bowel sounds [Abdomen Soft] : soft [Abdomen Tenderness] : non-tender [Abdomen Mass (___ Cm)] : no abdominal mass palpated [Abnormal Walk] : normal gait [Nail Clubbing] : no clubbing  or cyanosis of the fingernails [Musculoskeletal - Swelling] : no joint swelling seen [Motor Tone] : muscle strength and tone were normal [Skin Color & Pigmentation] : normal skin color and pigmentation [Skin Turgor] : normal skin turgor [] : no rash [No Focal Deficits] : no focal deficits [Oriented To Time, Place, And Person] : oriented to person, place, and time [Impaired Insight] : insight and judgment were intact [Affect] : the affect was normal [Spider Angioma] : No spider angioma(s) were observed [Abdominal  Ascites] : no ascites [Asterixis] : no asterixis observed [Jaundice] : No jaundice [Palmar Erythema] : no Palmar Erythema [Depression] : no depression [Hallucinations] : ~T no ~M hallucinations no

## 2024-10-09 NOTE — ED ADULT TRIAGE NOTE - ARRIVAL FROM
No heat/cold intolerance, no polyuria,polydipsia or polyphagia.   Neuro: No unilateral weakness, numbness, tingling. No seizures.   Heme: No easy bruising or bleeding.          Physical Exam:     Physical Exam:  /64   Pulse (!) 101   Temp 98.6 °F (37 °C) (Oral)   Resp 23   Ht 1.575 m (5' 2\")   Wt 111.6 kg (246 lb)   SpO2 94%   BMI 44.99 kg/m²         Temp (24hrs), Av.7 °F (37.1 °C), Min:98.6 °F (37 °C), Max:98.8 °F (37.1 °C)    No intake/output data recorded.   No intake/output data recorded.      General:  Awake, cooperative, no distress.   Head:  Normocephalic, without obvious abnormality, atraumatic.   Eyes:  Conjunctivae/corneas clear, sclera anicteric, PERRL, EOMs intact.   Nose: Nares normal. No drainage or sinus tenderness.   Throat: Lips, mucosa, and tongue normal.    Neck: Supple, symmetrical, trachea midline, no adenopathy.       Lungs:   Clear to auscultation bilaterally.       Heart:  Irregular rate and rhythm, S1, S2 normal, no murmur, click, rub or gallop.     Abdomen: Soft, non-tender. Bowel sounds normal. No masses,  No organomegaly.   Extremities: Extremities normal, atraumatic, no cyanosis or edema. Capillary refill normal.   Pulses: 2+ and symmetric all extremities.   Skin: Skin color pink/pale/mottled/flushed, turgor normal. No rashes or lesions   Neurologic: CNII-XII intact. No focal motor or sensory deficit.       Labs Reviewed:  Labs: Results:       Chemistry Recent Labs     10/09/24  1243      K 4.0      CO2 28   BUN 16   GLOB 4.2*   ,No results found for: \"LACTA\"   CBC w/Diff Recent Labs     10/09/24  1243   WBC 10.9   RBC 4.34*   HGB 14.7   HCT 42.7         Cardiac Enzymes Lab Results   Component Value Date    TROPHS 35 10/09/2024   ,No results found for: \"BNP\"   Coagulation No results for input(s): \"INR\", \"APTT\" in the last 72 hours.    Invalid input(s): \"PTP\"    Lipid Panel No results found for: \"CHOL\", \"CHOLPOCT\", \"CHLST\", \"CHOLV\", \"491795\", \"HDL\", 
Home

## 2024-10-10 ENCOUNTER — APPOINTMENT (OUTPATIENT)
Dept: VASCULAR SURGERY | Facility: CLINIC | Age: 64
End: 2024-10-10
Payer: MEDICAID

## 2024-10-10 VITALS
OXYGEN SATURATION: 93 % | BODY MASS INDEX: 32.16 KG/M2 | RESPIRATION RATE: 16 BRPM | HEART RATE: 82 BPM | WEIGHT: 147 LBS | DIASTOLIC BLOOD PRESSURE: 65 MMHG | SYSTOLIC BLOOD PRESSURE: 105 MMHG | TEMPERATURE: 98.3 F | HEIGHT: 56.5 IN

## 2024-10-10 DIAGNOSIS — I87.2 VENOUS INSUFFICIENCY (CHRONIC) (PERIPHERAL): ICD-10-CM

## 2024-10-10 PROCEDURE — 99204 OFFICE O/P NEW MOD 45 MIN: CPT

## 2024-10-10 PROCEDURE — 93970 EXTREMITY STUDY: CPT

## 2024-12-12 ENCOUNTER — APPOINTMENT (OUTPATIENT)
Dept: VASCULAR SURGERY | Facility: CLINIC | Age: 64
End: 2024-12-12

## 2024-12-12 VITALS
TEMPERATURE: 98.5 F | RESPIRATION RATE: 16 BRPM | WEIGHT: 147 LBS | HEIGHT: 56.5 IN | OXYGEN SATURATION: 95 % | SYSTOLIC BLOOD PRESSURE: 125 MMHG | DIASTOLIC BLOOD PRESSURE: 66 MMHG | BODY MASS INDEX: 32.16 KG/M2 | HEART RATE: 70 BPM

## 2024-12-12 PROCEDURE — 36475 ENDOVENOUS RF 1ST VEIN: CPT | Mod: LT

## 2024-12-18 ENCOUNTER — APPOINTMENT (OUTPATIENT)
Dept: VASCULAR SURGERY | Facility: CLINIC | Age: 64
End: 2024-12-18
Payer: MEDICAID

## 2024-12-18 VITALS
HEART RATE: 77 BPM | OXYGEN SATURATION: 95 % | DIASTOLIC BLOOD PRESSURE: 66 MMHG | RESPIRATION RATE: 16 BRPM | WEIGHT: 147 LBS | HEIGHT: 56.5 IN | TEMPERATURE: 98.3 F | BODY MASS INDEX: 32.16 KG/M2 | SYSTOLIC BLOOD PRESSURE: 143 MMHG

## 2024-12-18 DIAGNOSIS — I87.2 VENOUS INSUFFICIENCY (CHRONIC) (PERIPHERAL): ICD-10-CM

## 2024-12-18 PROCEDURE — 99213 OFFICE O/P EST LOW 20 MIN: CPT

## 2024-12-18 PROCEDURE — 93971 EXTREMITY STUDY: CPT | Mod: LT

## 2025-03-08 ENCOUNTER — OUTPATIENT (OUTPATIENT)
Dept: OUTPATIENT SERVICES | Facility: HOSPITAL | Age: 65
LOS: 1 days | End: 2025-03-08
Payer: MEDICAID

## 2025-03-08 ENCOUNTER — APPOINTMENT (OUTPATIENT)
Dept: MAMMOGRAPHY | Facility: CLINIC | Age: 65
End: 2025-03-08
Payer: MEDICAID

## 2025-03-08 DIAGNOSIS — Z92.3 PERSONAL HISTORY OF IRRADIATION: Chronic | ICD-10-CM

## 2025-03-08 DIAGNOSIS — Z00.00 ENCOUNTER FOR GENERAL ADULT MEDICAL EXAMINATION WITHOUT ABNORMAL FINDINGS: ICD-10-CM

## 2025-03-08 DIAGNOSIS — Z98.891 HISTORY OF UTERINE SCAR FROM PREVIOUS SURGERY: Chronic | ICD-10-CM

## 2025-03-08 PROCEDURE — 77063 BREAST TOMOSYNTHESIS BI: CPT

## 2025-03-08 PROCEDURE — 77067 SCR MAMMO BI INCL CAD: CPT

## 2025-03-08 PROCEDURE — 77067 SCR MAMMO BI INCL CAD: CPT | Mod: 26

## 2025-03-08 PROCEDURE — 77063 BREAST TOMOSYNTHESIS BI: CPT | Mod: 26

## 2025-03-13 ENCOUNTER — APPOINTMENT (OUTPATIENT)
Dept: GASTROENTEROLOGY | Facility: CLINIC | Age: 65
End: 2025-03-13
Payer: MEDICAID

## 2025-03-13 VITALS
WEIGHT: 147 LBS | HEART RATE: 80 BPM | RESPIRATION RATE: 16 BRPM | HEIGHT: 56.5 IN | SYSTOLIC BLOOD PRESSURE: 130 MMHG | BODY MASS INDEX: 32.16 KG/M2 | OXYGEN SATURATION: 98 % | DIASTOLIC BLOOD PRESSURE: 78 MMHG

## 2025-03-13 DIAGNOSIS — K21.9 GASTRO-ESOPHAGEAL REFLUX DISEASE W/OUT ESOPHAGITIS: ICD-10-CM

## 2025-03-13 PROCEDURE — 99204 OFFICE O/P NEW MOD 45 MIN: CPT

## 2025-03-14 RX ORDER — ROSUVASTATIN CALCIUM 10 MG/1
10 TABLET, FILM COATED ORAL
Refills: 0 | Status: ACTIVE | COMMUNITY

## 2025-03-14 RX ORDER — OMEPRAZOLE 20 MG/1
20 CAPSULE, DELAYED RELEASE ORAL
Refills: 0 | Status: ACTIVE | COMMUNITY

## 2025-03-22 ENCOUNTER — APPOINTMENT (OUTPATIENT)
Dept: ULTRASOUND IMAGING | Facility: CLINIC | Age: 65
End: 2025-03-22

## 2025-03-25 NOTE — ED ADULT NURSE NOTE - TELEPHONIC ID NUMBER OF THE INTERPRETER
Chief Complaint   Patient presents with    Medication Refill    Hand Pain     /81 (BP Site: Right Upper Arm, Patient Position: Sitting)   Pulse 71   Temp 98.1 °F (36.7 °C) (Oral)   Resp 16   Ht 1.626 m (5' 4\")   Wt 114.8 kg (253 lb)   SpO2 97%   BMI 43.43 kg/m²     \"Have you been to the ER, urgent care clinic since your last visit?  Hospitalized since your last visit?\"    NO    “Have you seen or consulted any other health care providers outside our system since your last visit?”    NO    Have you had a mammogram?”   NO    No breast cancer screening on file      “Have you had a pap smear?”    NO    No cervical cancer screening on file               Kenneth

## 2025-03-29 ENCOUNTER — OUTPATIENT (OUTPATIENT)
Dept: OUTPATIENT SERVICES | Facility: HOSPITAL | Age: 65
LOS: 1 days | End: 2025-03-29
Payer: MEDICAID

## 2025-03-29 ENCOUNTER — APPOINTMENT (OUTPATIENT)
Dept: ULTRASOUND IMAGING | Facility: CLINIC | Age: 65
End: 2025-03-29
Payer: MEDICAID

## 2025-03-29 DIAGNOSIS — K21.9 GASTRO-ESOPHAGEAL REFLUX DISEASE WITHOUT ESOPHAGITIS: ICD-10-CM

## 2025-03-29 DIAGNOSIS — Z92.3 PERSONAL HISTORY OF IRRADIATION: Chronic | ICD-10-CM

## 2025-03-29 DIAGNOSIS — Z98.891 HISTORY OF UTERINE SCAR FROM PREVIOUS SURGERY: Chronic | ICD-10-CM

## 2025-03-29 DIAGNOSIS — Z00.8 ENCOUNTER FOR OTHER GENERAL EXAMINATION: ICD-10-CM

## 2025-03-29 PROCEDURE — 76700 US EXAM ABDOM COMPLETE: CPT

## 2025-03-29 PROCEDURE — 76700 US EXAM ABDOM COMPLETE: CPT | Mod: 26

## 2025-05-19 ENCOUNTER — NON-APPOINTMENT (OUTPATIENT)
Age: 65
End: 2025-05-19

## 2025-05-21 ENCOUNTER — NON-APPOINTMENT (OUTPATIENT)
Age: 65
End: 2025-05-21

## 2025-05-21 ENCOUNTER — APPOINTMENT (OUTPATIENT)
Dept: GASTROENTEROLOGY | Facility: CLINIC | Age: 65
End: 2025-05-21
Payer: MEDICAID

## 2025-05-21 VITALS
HEIGHT: 56 IN | SYSTOLIC BLOOD PRESSURE: 162 MMHG | HEART RATE: 70 BPM | RESPIRATION RATE: 14 BRPM | BODY MASS INDEX: 33.74 KG/M2 | DIASTOLIC BLOOD PRESSURE: 76 MMHG | OXYGEN SATURATION: 98 % | WEIGHT: 150 LBS

## 2025-05-21 DIAGNOSIS — Z09 ENCOUNTER FOR FOLLOW-UP EXAMINATION AFTER COMPLETED TREATMENT FOR CONDITIONS OTHER THAN MALIGNANT NEOPLASM: ICD-10-CM

## 2025-05-21 DIAGNOSIS — K82.4 CHOLESTEROLOSIS OF GALLBLADDER: ICD-10-CM

## 2025-05-21 DIAGNOSIS — Z78.9 OTHER SPECIFIED HEALTH STATUS: ICD-10-CM

## 2025-05-21 PROCEDURE — 99213 OFFICE O/P EST LOW 20 MIN: CPT

## 2025-05-21 RX ORDER — OMEPRAZOLE 20 MG/1
20 CAPSULE, DELAYED RELEASE ORAL DAILY
Qty: 90 | Refills: 3 | Status: ACTIVE | COMMUNITY
Start: 2025-05-21 | End: 1900-01-01

## 2025-05-24 RX ORDER — AMLODIPINE BESYLATE 5 MG/1
5 TABLET ORAL
Refills: 0 | Status: ACTIVE | COMMUNITY

## 2025-06-03 ENCOUNTER — APPOINTMENT (OUTPATIENT)
Dept: GASTROENTEROLOGY | Facility: CLINIC | Age: 65
End: 2025-06-03
